# Patient Record
Sex: FEMALE | HISPANIC OR LATINO | URBAN - METROPOLITAN AREA
[De-identification: names, ages, dates, MRNs, and addresses within clinical notes are randomized per-mention and may not be internally consistent; named-entity substitution may affect disease eponyms.]

---

## 2017-05-21 ENCOUNTER — INPATIENT (INPATIENT)
Facility: HOSPITAL | Age: 82
LOS: 10 days | End: 2017-06-01
Attending: FAMILY MEDICINE | Admitting: FAMILY MEDICINE
Payer: MEDICARE

## 2017-05-21 VITALS — HEIGHT: 60 IN | WEIGHT: 179.9 LBS

## 2017-05-21 PROCEDURE — 99285 EMERGENCY DEPT VISIT HI MDM: CPT | Mod: 25

## 2017-05-21 PROCEDURE — 93010 ELECTROCARDIOGRAM REPORT: CPT

## 2017-05-21 RX ORDER — SODIUM CHLORIDE 9 MG/ML
1000 INJECTION INTRAMUSCULAR; INTRAVENOUS; SUBCUTANEOUS ONCE
Qty: 0 | Refills: 0 | Status: COMPLETED | OUTPATIENT
Start: 2017-05-21 | End: 2017-05-21

## 2017-05-21 RX ORDER — MORPHINE SULFATE 50 MG/1
2 CAPSULE, EXTENDED RELEASE ORAL ONCE
Qty: 0 | Refills: 0 | Status: DISCONTINUED | OUTPATIENT
Start: 2017-05-21 | End: 2017-05-21

## 2017-05-21 RX ORDER — ONDANSETRON 8 MG/1
4 TABLET, FILM COATED ORAL ONCE
Qty: 0 | Refills: 0 | Status: COMPLETED | OUTPATIENT
Start: 2017-05-21 | End: 2017-05-21

## 2017-05-21 NOTE — ED ADULT NURSE NOTE - OBJECTIVE STATEMENT
pt presents to ED c/o lower abd pain. pt is a&ox3, breathing even with unlabored respirations. pt denies any n/v/d, fevers chills. EKG performed. pt has no s/s of acute distress at this time. will continue to monitor.

## 2017-05-22 LAB
ALBUMIN SERPL ELPH-MCNC: 3.5 G/DL — SIGNIFICANT CHANGE UP (ref 3.3–5)
ALP SERPL-CCNC: 117 U/L — SIGNIFICANT CHANGE UP (ref 40–120)
ALT FLD-CCNC: 26 U/L — SIGNIFICANT CHANGE UP (ref 12–78)
ANION GAP SERPL CALC-SCNC: 6 MMOL/L — SIGNIFICANT CHANGE UP (ref 5–17)
APPEARANCE UR: CLEAR — SIGNIFICANT CHANGE UP
APTT BLD: 31 SEC — SIGNIFICANT CHANGE UP (ref 27.5–37.4)
AST SERPL-CCNC: 26 U/L — SIGNIFICANT CHANGE UP (ref 15–37)
BACTERIA # UR AUTO: (no result)
BASOPHILS # BLD AUTO: 0.1 K/UL — SIGNIFICANT CHANGE UP (ref 0–0.2)
BASOPHILS NFR BLD AUTO: 0.9 % — SIGNIFICANT CHANGE UP (ref 0–2)
BILIRUB SERPL-MCNC: 0.6 MG/DL — SIGNIFICANT CHANGE UP (ref 0.2–1.2)
BILIRUB UR-MCNC: NEGATIVE — SIGNIFICANT CHANGE UP
BUN SERPL-MCNC: 20 MG/DL — SIGNIFICANT CHANGE UP (ref 7–23)
CALCIUM SERPL-MCNC: 8.3 MG/DL — LOW (ref 8.5–10.1)
CHLORIDE SERPL-SCNC: 108 MMOL/L — SIGNIFICANT CHANGE UP (ref 96–108)
CK SERPL-CCNC: 38 U/L — SIGNIFICANT CHANGE UP (ref 26–192)
CK SERPL-CCNC: 42 U/L — SIGNIFICANT CHANGE UP (ref 26–192)
CK SERPL-CCNC: 65 U/L — SIGNIFICANT CHANGE UP (ref 26–192)
CO2 SERPL-SCNC: 24 MMOL/L — SIGNIFICANT CHANGE UP (ref 22–31)
COLOR SPEC: YELLOW — SIGNIFICANT CHANGE UP
CREAT SERPL-MCNC: 1.34 MG/DL — HIGH (ref 0.5–1.3)
DIFF PNL FLD: (no result)
EOSINOPHIL # BLD AUTO: 0.3 K/UL — SIGNIFICANT CHANGE UP (ref 0–0.5)
EOSINOPHIL NFR BLD AUTO: 3.6 % — SIGNIFICANT CHANGE UP (ref 0–6)
EPI CELLS # UR: NEGATIVE — SIGNIFICANT CHANGE UP
GLUCOSE SERPL-MCNC: 143 MG/DL — HIGH (ref 70–99)
GLUCOSE UR QL: NEGATIVE MG/DL — SIGNIFICANT CHANGE UP
HCT VFR BLD CALC: 38 % — SIGNIFICANT CHANGE UP (ref 34.5–45)
HGB BLD-MCNC: 12.3 G/DL — SIGNIFICANT CHANGE UP (ref 11.5–15.5)
INR BLD: 0.95 RATIO — SIGNIFICANT CHANGE UP (ref 0.88–1.16)
KETONES UR-MCNC: NEGATIVE — SIGNIFICANT CHANGE UP
LACTATE SERPL-SCNC: 2.6 MMOL/L — HIGH (ref 0.7–2)
LEUKOCYTE ESTERASE UR-ACNC: NEGATIVE — SIGNIFICANT CHANGE UP
LIDOCAIN IGE QN: 358 U/L — SIGNIFICANT CHANGE UP (ref 73–393)
LIDOCAIN IGE QN: 498 U/L — HIGH (ref 73–393)
LYMPHOCYTES # BLD AUTO: 1.2 K/UL — SIGNIFICANT CHANGE UP (ref 1–3.3)
LYMPHOCYTES # BLD AUTO: 15.4 % — SIGNIFICANT CHANGE UP (ref 13–44)
MAGNESIUM SERPL-MCNC: 1.7 MG/DL — SIGNIFICANT CHANGE UP (ref 1.6–2.6)
MCHC RBC-ENTMCNC: 32.3 GM/DL — SIGNIFICANT CHANGE UP (ref 32–36)
MCHC RBC-ENTMCNC: 33.4 PG — SIGNIFICANT CHANGE UP (ref 27–34)
MCV RBC AUTO: 103.2 FL — HIGH (ref 80–100)
MONOCYTES # BLD AUTO: 0.5 K/UL — SIGNIFICANT CHANGE UP (ref 0–0.9)
MONOCYTES NFR BLD AUTO: 6.2 % — SIGNIFICANT CHANGE UP (ref 2–14)
NEUTROPHILS # BLD AUTO: 5.7 K/UL — SIGNIFICANT CHANGE UP (ref 1.8–7.4)
NEUTROPHILS NFR BLD AUTO: 73.9 % — SIGNIFICANT CHANGE UP (ref 43–77)
NITRITE UR-MCNC: NEGATIVE — SIGNIFICANT CHANGE UP
PH UR: 6 — SIGNIFICANT CHANGE UP (ref 5–8)
PLATELET # BLD AUTO: 272 K/UL — SIGNIFICANT CHANGE UP (ref 150–400)
POTASSIUM SERPL-MCNC: 5.2 MMOL/L — SIGNIFICANT CHANGE UP (ref 3.5–5.3)
POTASSIUM SERPL-SCNC: 5.2 MMOL/L — SIGNIFICANT CHANGE UP (ref 3.5–5.3)
PROT SERPL-MCNC: 8.2 GM/DL — SIGNIFICANT CHANGE UP (ref 6–8.3)
PROT UR-MCNC: NEGATIVE MG/DL — SIGNIFICANT CHANGE UP
PROTHROM AB SERPL-ACNC: 10.2 SEC — SIGNIFICANT CHANGE UP (ref 9.8–12.7)
RBC # BLD: 3.68 M/UL — LOW (ref 3.8–5.2)
RBC # FLD: 12.4 % — SIGNIFICANT CHANGE UP (ref 10.3–14.5)
RBC CASTS # UR COMP ASSIST: (no result) /HPF (ref 0–4)
SODIUM SERPL-SCNC: 138 MMOL/L — SIGNIFICANT CHANGE UP (ref 135–145)
SP GR SPEC: 1.01 — SIGNIFICANT CHANGE UP (ref 1.01–1.02)
TROPONIN I SERPL-MCNC: 0.03 NG/ML — SIGNIFICANT CHANGE UP (ref 0.01–0.04)
TROPONIN I SERPL-MCNC: <0.015 NG/ML — SIGNIFICANT CHANGE UP (ref 0.01–0.04)
TROPONIN I SERPL-MCNC: <0.015 NG/ML — SIGNIFICANT CHANGE UP (ref 0.01–0.04)
UROBILINOGEN FLD QL: NEGATIVE MG/DL — SIGNIFICANT CHANGE UP
WBC # BLD: 7.7 K/UL — SIGNIFICANT CHANGE UP (ref 3.8–10.5)
WBC # FLD AUTO: 7.7 K/UL — SIGNIFICANT CHANGE UP (ref 3.8–10.5)
WBC UR QL: SIGNIFICANT CHANGE UP

## 2017-05-22 PROCEDURE — 78226 HEPATOBILIARY SYSTEM IMAGING: CPT | Mod: 26

## 2017-05-22 PROCEDURE — 76705 ECHO EXAM OF ABDOMEN: CPT | Mod: 26

## 2017-05-22 PROCEDURE — 74176 CT ABD & PELVIS W/O CONTRAST: CPT | Mod: 26

## 2017-05-22 RX ORDER — METRONIDAZOLE 500 MG
500 TABLET ORAL ONCE
Qty: 0 | Refills: 0 | Status: COMPLETED | OUTPATIENT
Start: 2017-05-22 | End: 2017-05-22

## 2017-05-22 RX ORDER — OMEPRAZOLE 10 MG/1
1 CAPSULE, DELAYED RELEASE ORAL
Qty: 0 | Refills: 0 | COMMUNITY

## 2017-05-22 RX ORDER — METOPROLOL TARTRATE 50 MG
50 TABLET ORAL DAILY
Qty: 0 | Refills: 0 | Status: DISCONTINUED | OUTPATIENT
Start: 2017-05-22 | End: 2017-05-28

## 2017-05-22 RX ORDER — GLUCAGON INJECTION, SOLUTION 0.5 MG/.1ML
1 INJECTION, SOLUTION SUBCUTANEOUS ONCE
Qty: 0 | Refills: 0 | Status: DISCONTINUED | OUTPATIENT
Start: 2017-05-22 | End: 2017-05-29

## 2017-05-22 RX ORDER — METOPROLOL TARTRATE 50 MG
1 TABLET ORAL
Qty: 0 | Refills: 0 | COMMUNITY

## 2017-05-22 RX ORDER — METRONIDAZOLE 500 MG
500 TABLET ORAL EVERY 8 HOURS
Qty: 0 | Refills: 0 | Status: DISCONTINUED | OUTPATIENT
Start: 2017-05-22 | End: 2017-05-25

## 2017-05-22 RX ORDER — MORPHINE SULFATE 50 MG/1
3.3 CAPSULE, EXTENDED RELEASE ORAL ONCE
Qty: 0 | Refills: 0 | Status: DISCONTINUED | OUTPATIENT
Start: 2017-05-22 | End: 2017-05-22

## 2017-05-22 RX ORDER — CIPROFLOXACIN LACTATE 400MG/40ML
400 VIAL (ML) INTRAVENOUS EVERY 12 HOURS
Qty: 0 | Refills: 0 | Status: DISCONTINUED | OUTPATIENT
Start: 2017-05-22 | End: 2017-05-22

## 2017-05-22 RX ORDER — DEXTROSE 50 % IN WATER 50 %
25 SYRINGE (ML) INTRAVENOUS ONCE
Qty: 0 | Refills: 0 | Status: DISCONTINUED | OUTPATIENT
Start: 2017-05-22 | End: 2017-05-29

## 2017-05-22 RX ORDER — CIPROFLOXACIN LACTATE 400MG/40ML
400 VIAL (ML) INTRAVENOUS ONCE
Qty: 0 | Refills: 0 | Status: COMPLETED | OUTPATIENT
Start: 2017-05-22 | End: 2017-05-22

## 2017-05-22 RX ORDER — ACETAMINOPHEN 500 MG
650 TABLET ORAL EVERY 6 HOURS
Qty: 0 | Refills: 0 | Status: DISCONTINUED | OUTPATIENT
Start: 2017-05-22 | End: 2017-06-01

## 2017-05-22 RX ORDER — LOSARTAN POTASSIUM 100 MG/1
25 TABLET, FILM COATED ORAL DAILY
Qty: 0 | Refills: 0 | Status: DISCONTINUED | OUTPATIENT
Start: 2017-05-22 | End: 2017-05-28

## 2017-05-22 RX ORDER — SODIUM CHLORIDE 9 MG/ML
1000 INJECTION, SOLUTION INTRAVENOUS
Qty: 0 | Refills: 0 | Status: DISCONTINUED | OUTPATIENT
Start: 2017-05-22 | End: 2017-05-29

## 2017-05-22 RX ORDER — ASPIRIN/CALCIUM CARB/MAGNESIUM 324 MG
325 TABLET ORAL DAILY
Qty: 0 | Refills: 0 | Status: DISCONTINUED | OUTPATIENT
Start: 2017-05-22 | End: 2017-05-25

## 2017-05-22 RX ORDER — SODIUM CHLORIDE 9 MG/ML
1000 INJECTION INTRAMUSCULAR; INTRAVENOUS; SUBCUTANEOUS
Qty: 0 | Refills: 0 | Status: DISCONTINUED | OUTPATIENT
Start: 2017-05-22 | End: 2017-05-25

## 2017-05-22 RX ORDER — LOSARTAN POTASSIUM 100 MG/1
25 TABLET, FILM COATED ORAL
Qty: 0 | Refills: 0 | COMMUNITY

## 2017-05-22 RX ORDER — HEPARIN SODIUM 5000 [USP'U]/ML
5000 INJECTION INTRAVENOUS; SUBCUTANEOUS EVERY 12 HOURS
Qty: 0 | Refills: 0 | Status: DISCONTINUED | OUTPATIENT
Start: 2017-05-22 | End: 2017-05-25

## 2017-05-22 RX ORDER — INSULIN LISPRO 100/ML
VIAL (ML) SUBCUTANEOUS AT BEDTIME
Qty: 0 | Refills: 0 | Status: DISCONTINUED | OUTPATIENT
Start: 2017-05-22 | End: 2017-05-28

## 2017-05-22 RX ORDER — DEXTROSE 50 % IN WATER 50 %
1 SYRINGE (ML) INTRAVENOUS ONCE
Qty: 0 | Refills: 0 | Status: DISCONTINUED | OUTPATIENT
Start: 2017-05-22 | End: 2017-05-29

## 2017-05-22 RX ORDER — POLYETHYLENE GLYCOL 3350 17 G/17G
17 POWDER, FOR SOLUTION ORAL DAILY
Qty: 0 | Refills: 0 | Status: DISCONTINUED | OUTPATIENT
Start: 2017-05-22 | End: 2017-06-01

## 2017-05-22 RX ORDER — CIPROFLOXACIN LACTATE 400MG/40ML
400 VIAL (ML) INTRAVENOUS ONCE
Qty: 0 | Refills: 0 | Status: COMPLETED | OUTPATIENT
Start: 2017-05-22 | End: 2017-05-23

## 2017-05-22 RX ORDER — ATORVASTATIN CALCIUM 80 MG/1
10 TABLET, FILM COATED ORAL AT BEDTIME
Qty: 0 | Refills: 0 | Status: DISCONTINUED | OUTPATIENT
Start: 2017-05-22 | End: 2017-06-01

## 2017-05-22 RX ORDER — DOCUSATE SODIUM 100 MG
100 CAPSULE ORAL THREE TIMES A DAY
Qty: 0 | Refills: 0 | Status: DISCONTINUED | OUTPATIENT
Start: 2017-05-22 | End: 2017-05-29

## 2017-05-22 RX ORDER — INSULIN LISPRO 100/ML
VIAL (ML) SUBCUTANEOUS
Qty: 0 | Refills: 0 | Status: DISCONTINUED | OUTPATIENT
Start: 2017-05-22 | End: 2017-05-28

## 2017-05-22 RX ORDER — SODIUM CHLORIDE 9 MG/ML
1000 INJECTION INTRAMUSCULAR; INTRAVENOUS; SUBCUTANEOUS ONCE
Qty: 0 | Refills: 0 | Status: COMPLETED | OUTPATIENT
Start: 2017-05-22 | End: 2017-05-22

## 2017-05-22 RX ORDER — PANTOPRAZOLE SODIUM 20 MG/1
40 TABLET, DELAYED RELEASE ORAL
Qty: 0 | Refills: 0 | Status: DISCONTINUED | OUTPATIENT
Start: 2017-05-22 | End: 2017-05-28

## 2017-05-22 RX ORDER — DEXTROSE 50 % IN WATER 50 %
12.5 SYRINGE (ML) INTRAVENOUS ONCE
Qty: 0 | Refills: 0 | Status: DISCONTINUED | OUTPATIENT
Start: 2017-05-22 | End: 2017-05-29

## 2017-05-22 RX ADMIN — SODIUM CHLORIDE 1000 MILLILITER(S): 9 INJECTION INTRAMUSCULAR; INTRAVENOUS; SUBCUTANEOUS at 04:33

## 2017-05-22 RX ADMIN — SODIUM CHLORIDE 100 MILLILITER(S): 9 INJECTION INTRAMUSCULAR; INTRAVENOUS; SUBCUTANEOUS at 19:08

## 2017-05-22 RX ADMIN — MORPHINE SULFATE 2 MILLIGRAM(S): 50 CAPSULE, EXTENDED RELEASE ORAL at 03:23

## 2017-05-22 RX ADMIN — Medication 50 MILLIGRAM(S): at 19:08

## 2017-05-22 RX ADMIN — SODIUM CHLORIDE 1000 MILLILITER(S): 9 INJECTION INTRAMUSCULAR; INTRAVENOUS; SUBCUTANEOUS at 00:00

## 2017-05-22 RX ADMIN — ONDANSETRON 4 MILLIGRAM(S): 8 TABLET, FILM COATED ORAL at 00:00

## 2017-05-22 RX ADMIN — Medication 100 MILLIGRAM(S): at 04:33

## 2017-05-22 RX ADMIN — ATORVASTATIN CALCIUM 10 MILLIGRAM(S): 80 TABLET, FILM COATED ORAL at 21:39

## 2017-05-22 RX ADMIN — Medication 100 MILLIGRAM(S): at 19:08

## 2017-05-22 RX ADMIN — Medication 200 MILLIGRAM(S): at 06:15

## 2017-05-22 RX ADMIN — MORPHINE SULFATE 3.3 MILLIGRAM(S): 50 CAPSULE, EXTENDED RELEASE ORAL at 12:05

## 2017-05-22 RX ADMIN — MORPHINE SULFATE 2 MILLIGRAM(S): 50 CAPSULE, EXTENDED RELEASE ORAL at 00:00

## 2017-05-22 NOTE — ED PROVIDER NOTE - CHPI ED SYMPTOMS NEG
no abdominal distension/no fever/no hematuria/no dysuria/no vomiting/no chills/no burning urination/no nausea/no diarrhea

## 2017-05-22 NOTE — ED PROVIDER NOTE - PROGRESS NOTE DETAILS
Discussed labs, CT and US results with patient and her son.  Son left to go home as he has work tomorrow.  Left home phone number on paper chart with Nurse.  Pt. has seen Dr. Pettit in the past and requests to be seen by him during her hospital stay.  Spoke to Dr. Augustin about her admission who is requesting Cipro, Flagyl to be given even though no fever or elevation of WBC.  Also requesting lactate be added.

## 2017-05-22 NOTE — H&P ADULT - HISTORY OF PRESENT ILLNESS
87F.  c/o abdominal pain.  left flank which wraps arround to right side beneath diaphragm.   onset 1 day ago.  no provocative or palliative factors.  no a/w diet.    ED given MS + Zosyn + NS.  US (+) gallstone and sludge.  CT AB/pelvis (+) gallstone and sludge in gallbladder neck.  (+) inflammation.  lactate 2.6.      PMHx:  type 2DM;  HTN;  hyperlipidemia.    PSHx:  hip sx.    ALL:  PCN-swelling of the lips.    Rx:  reviewed.    SocHx:  lives w/ son in the community.  able to ambulate w/ cane.  no tobacco.  no EtOH.    FHx:  NC.

## 2017-05-22 NOTE — ED PROVIDER NOTE - GASTROINTESTINAL NEGATIVE STATEMENT, MLM
+left flank pain and LUQ pain that radiates to RUQ, no bloating, no constipation, no diarrhea, no nausea and no vomiting.

## 2017-05-22 NOTE — ED PROVIDER NOTE - OBJECTIVE STATEMENT
86 y/o F with PMH of DM, HTN, HLD presents to ED for x1 day of left flank and LUQ pain that radiates across upper abd to RUQ. Pt denies similar symptoms in the past. No associated fever or N/V/D. Denies change in urine or any dysuria. No rash, CP, SOB. PMD

## 2017-05-22 NOTE — ED PROVIDER NOTE - NS ED MD SCRIBE ATTENDING SCRIBE SECTIONS
PHYSICAL EXAM/REVIEW OF SYSTEMS/DISPOSITION/RESULTS/HISTORY OF PRESENT ILLNESS/PAST MEDICAL/SURGICAL/SOCIAL HISTORY

## 2017-05-22 NOTE — ED ADULT NURSE REASSESSMENT NOTE - NS ED NURSE REASSESS COMMENT FT1
[phone]- Radha (son's wife-preferred number to call)  [phone]- [place]  [phone]-  (son)

## 2017-05-22 NOTE — H&P ADULT - ASSESSMENT
gallstone pancreatitis.  r/o acute cholecystitis.  -elevated lactate 2.6, c/w IVFs and f/u serial lactate.  -trend LFTs, lipase, amylase and CBC.   -clear liquid diet.  -HIDA scan.  -c/w ciprofloxacin + metronidazole.  -GI + Sx + ID consults.    nonspecific EKG changes.  -V2-V3 new nonspecific changes as compared to 04/2016.    hx HTN.  -c/w losartan.    hx hyperlipidemia.  -c/w statin.    hx type 2DM.  -A1C.  -ADA.  -correction insulin coverage. gallstone pancreatitis.  r/o acute cholecystitis.  -elevated lactate 2.6, c/w IVFs and f/u serial lactate.  -f/u BCx.  -trend LFTs, lipase, amylase and CBC.   -clear liquid diet.  -HIDA scan.  -c/w ciprofloxacin + metronidazole.  -GI + Sx + ID consults.    nonspecific EKG changes.  -V2-V3 new nonspecific changes as compared to 04/2016.    hx HTN.  -c/w losartan.    hx hyperlipidemia.  -c/w statin.    hx type 2DM.  -A1C.  -ADA.  -correction insulin coverage. gallstone pancreatitis.  r/o acute cholecystitis.  -elevated lactate 2.6, c/w IVFs and f/u serial lactate.  -f/u BCx.  -trend LFTs, lipase, amylase and CBC.   -clear liquid diet.  -HIDA scan.  -c/w ciprofloxacin + metronidazole.  -GI + Sx + ID consults.    nonspecific EKG changes.  -at current time, asymptomatic for s/sx of ischemic heart disease.  -V2-V3 new nonspecific changes as compared to 04/2016.    hx HTN.  -c/w losartan.    hx hyperlipidemia.  -c/w statin.    hx type 2DM.  -A1C.  -ADA.  -correction insulin coverage.

## 2017-05-22 NOTE — ED PROVIDER NOTE - DETAILS:
I, Tatum Golden, performed the initial face to face bedside interview with this patient regarding history of present illness, review of symptoms and relevant past medical, social and family history.  I completed an independent physical examination.  I was the initial provider who evaluated this patient.   The history, relevant review of systems, past medical and surgical history, medical decision making, and physical examination was documented by the scribe in my presence and I attest to the accuracy of the documentation.

## 2017-05-22 NOTE — ED ADULT NURSE REASSESSMENT NOTE - NS ED NURSE REASSESS COMMENT FT1
Assumed care of pt. Pt A&Ox3 and calm. Pt denies any abd pain right now. Abd soft, no distention. Abx infusing per orders, will monitor.

## 2017-05-23 LAB
ALBUMIN SERPL ELPH-MCNC: 2.8 G/DL — LOW (ref 3.3–5)
ALP SERPL-CCNC: 83 U/L — SIGNIFICANT CHANGE UP (ref 40–120)
ALT FLD-CCNC: 22 U/L — SIGNIFICANT CHANGE UP (ref 12–78)
AMYLASE P1 CFR SERPL: 81 U/L — SIGNIFICANT CHANGE UP (ref 25–115)
ANION GAP SERPL CALC-SCNC: 9 MMOL/L — SIGNIFICANT CHANGE UP (ref 5–17)
AST SERPL-CCNC: 18 U/L — SIGNIFICANT CHANGE UP (ref 15–37)
BASOPHILS # BLD AUTO: 0.1 K/UL — SIGNIFICANT CHANGE UP (ref 0–0.2)
BASOPHILS NFR BLD AUTO: 1.3 % — SIGNIFICANT CHANGE UP (ref 0–2)
BILIRUB DIRECT SERPL-MCNC: 0.2 MG/DL — SIGNIFICANT CHANGE UP (ref 0–0.2)
BILIRUB INDIRECT FLD-MCNC: 0.9 MG/DL — SIGNIFICANT CHANGE UP (ref 0.2–1)
BILIRUB SERPL-MCNC: 1.1 MG/DL — SIGNIFICANT CHANGE UP (ref 0.2–1.2)
BUN SERPL-MCNC: 11 MG/DL — SIGNIFICANT CHANGE UP (ref 7–23)
CALCIUM SERPL-MCNC: 8.1 MG/DL — LOW (ref 8.5–10.1)
CHLORIDE SERPL-SCNC: 111 MMOL/L — HIGH (ref 96–108)
CO2 SERPL-SCNC: 23 MMOL/L — SIGNIFICANT CHANGE UP (ref 22–31)
CREAT SERPL-MCNC: 0.99 MG/DL — SIGNIFICANT CHANGE UP (ref 0.5–1.3)
CULTURE RESULTS: NO GROWTH — SIGNIFICANT CHANGE UP
EOSINOPHIL # BLD AUTO: 0.3 K/UL — SIGNIFICANT CHANGE UP (ref 0–0.5)
EOSINOPHIL NFR BLD AUTO: 4.8 % — SIGNIFICANT CHANGE UP (ref 0–6)
GLUCOSE SERPL-MCNC: 129 MG/DL — HIGH (ref 70–99)
HBA1C BLD-MCNC: 6.1 % — HIGH (ref 4–5.6)
HCT VFR BLD CALC: 33.4 % — LOW (ref 34.5–45)
HGB BLD-MCNC: 10.6 G/DL — LOW (ref 11.5–15.5)
LACTATE SERPL-SCNC: 0.9 MMOL/L — SIGNIFICANT CHANGE UP (ref 0.7–2)
LIDOCAIN IGE QN: 209 U/L — SIGNIFICANT CHANGE UP (ref 73–393)
LYMPHOCYTES # BLD AUTO: 0.9 K/UL — LOW (ref 1–3.3)
LYMPHOCYTES # BLD AUTO: 16.7 % — SIGNIFICANT CHANGE UP (ref 13–44)
MCHC RBC-ENTMCNC: 31.8 GM/DL — LOW (ref 32–36)
MCHC RBC-ENTMCNC: 32.8 PG — SIGNIFICANT CHANGE UP (ref 27–34)
MCV RBC AUTO: 103.2 FL — HIGH (ref 80–100)
MONOCYTES # BLD AUTO: 0.5 K/UL — SIGNIFICANT CHANGE UP (ref 0–0.9)
MONOCYTES NFR BLD AUTO: 9.5 % — SIGNIFICANT CHANGE UP (ref 2–14)
NEUTROPHILS # BLD AUTO: 3.8 K/UL — SIGNIFICANT CHANGE UP (ref 1.8–7.4)
NEUTROPHILS NFR BLD AUTO: 67.7 % — SIGNIFICANT CHANGE UP (ref 43–77)
PLATELET # BLD AUTO: 209 K/UL — SIGNIFICANT CHANGE UP (ref 150–400)
POTASSIUM SERPL-MCNC: 5 MMOL/L — SIGNIFICANT CHANGE UP (ref 3.5–5.3)
POTASSIUM SERPL-SCNC: 5 MMOL/L — SIGNIFICANT CHANGE UP (ref 3.5–5.3)
PROT SERPL-MCNC: 6.8 GM/DL — SIGNIFICANT CHANGE UP (ref 6–8.3)
RBC # BLD: 3.24 M/UL — LOW (ref 3.8–5.2)
RBC # FLD: 12.4 % — SIGNIFICANT CHANGE UP (ref 10.3–14.5)
SODIUM SERPL-SCNC: 143 MMOL/L — SIGNIFICANT CHANGE UP (ref 135–145)
SPECIMEN SOURCE: SIGNIFICANT CHANGE UP
WBC # BLD: 5.6 K/UL — SIGNIFICANT CHANGE UP (ref 3.8–10.5)
WBC # FLD AUTO: 5.6 K/UL — SIGNIFICANT CHANGE UP (ref 3.8–10.5)

## 2017-05-23 RX ORDER — CIPROFLOXACIN LACTATE 400MG/40ML
400 VIAL (ML) INTRAVENOUS EVERY 12 HOURS
Qty: 0 | Refills: 0 | Status: DISCONTINUED | OUTPATIENT
Start: 2017-05-23 | End: 2017-05-25

## 2017-05-23 RX ADMIN — Medication 2: at 13:40

## 2017-05-23 RX ADMIN — Medication 200 MILLIGRAM(S): at 00:13

## 2017-05-23 RX ADMIN — HEPARIN SODIUM 5000 UNIT(S): 5000 INJECTION INTRAVENOUS; SUBCUTANEOUS at 05:11

## 2017-05-23 RX ADMIN — POLYETHYLENE GLYCOL 3350 17 GRAM(S): 17 POWDER, FOR SOLUTION ORAL at 11:17

## 2017-05-23 RX ADMIN — Medication 100 MILLIGRAM(S): at 05:10

## 2017-05-23 RX ADMIN — Medication 1 TABLET(S): at 11:09

## 2017-05-23 RX ADMIN — Medication 325 MILLIGRAM(S): at 11:09

## 2017-05-23 RX ADMIN — SODIUM CHLORIDE 100 MILLILITER(S): 9 INJECTION INTRAMUSCULAR; INTRAVENOUS; SUBCUTANEOUS at 11:07

## 2017-05-23 RX ADMIN — PANTOPRAZOLE SODIUM 40 MILLIGRAM(S): 20 TABLET, DELAYED RELEASE ORAL at 06:56

## 2017-05-23 RX ADMIN — Medication 650 MILLIGRAM(S): at 11:10

## 2017-05-23 RX ADMIN — ATORVASTATIN CALCIUM 10 MILLIGRAM(S): 80 TABLET, FILM COATED ORAL at 22:38

## 2017-05-23 RX ADMIN — Medication 100 MILLIGRAM(S): at 22:12

## 2017-05-23 RX ADMIN — Medication 50 MILLIGRAM(S): at 05:14

## 2017-05-23 RX ADMIN — HEPARIN SODIUM 5000 UNIT(S): 5000 INJECTION INTRAVENOUS; SUBCUTANEOUS at 17:49

## 2017-05-23 RX ADMIN — LOSARTAN POTASSIUM 25 MILLIGRAM(S): 100 TABLET, FILM COATED ORAL at 05:11

## 2017-05-23 RX ADMIN — Medication 200 MILLIGRAM(S): at 23:43

## 2017-05-23 RX ADMIN — Medication 200 MILLIGRAM(S): at 11:09

## 2017-05-23 RX ADMIN — SODIUM CHLORIDE 100 MILLILITER(S): 9 INJECTION INTRAMUSCULAR; INTRAVENOUS; SUBCUTANEOUS at 22:33

## 2017-05-23 RX ADMIN — Medication 100 MILLIGRAM(S): at 13:47

## 2017-05-23 RX ADMIN — Medication 650 MILLIGRAM(S): at 22:12

## 2017-05-23 NOTE — CDI QUERY NOTE - NSCDIOTHERTXTBX_GEN_ALL_CORE_HH
On admission lactate level = 2.6 now lactate= 0.9    Please clarify if the lactate level of 2.6 was indicative of a diagnosis ?  ie..  lactic acidosis now resolved ?  .....  Lacticemia, now resolved ?  ....  Other ? Please clarify

## 2017-05-23 NOTE — PROGRESS NOTE ADULT - SUBJECTIVE AND OBJECTIVE BOX
5/23- seen and examined,  phone used #[phone]- complaining of left sided abdominal pain -radiating to the right side. Stated that it is worse than last night.    Vital Signs Last 24 Hrs  T(C): 36.6, Max: 36.9 (05-22 @ 21:30)  T(F): 97.9, Max: 98.5 (05-22 @ 21:30)  HR: 68 (68 - 78)  BP: 156/85 (120/76 - 156/85)  BP(mean): --  RR: 18 (17 - 18)  SpO2: 97% (93% - 97%)    ROS:   All 10 systems reviewed and found to be negative with the exception of what has been described above.    PE:  Constitutional: NAD, OOB to chair  HEENT: NC/AT, EOMI, PERRLA  Neck: supple  Back: no tenderness  Respiratory: LCTA  Cardiovascular: S1S2 regular, no murmurs  Abdomen: obese- Left upper quadrant tenderness +BS soft  Genitourinary: voiding  Rectal: deferred  Musculoskeletal: no muscle tenderness, no joint swelling or tenderness  Extremities: no pedal edema   Neurological: no focal deficits                           10.6   5.6   )-----------( 209      ( 23 May 2017 06:23 )             33.4   05-23    143  |  111<H>  |  11  ----------------------------<  129<H>  5.0   |  23  |  0.99    Ca    8.1<L>      23 May 2017 06:23  Mg     1.7     05-21    TPro  6.8  /  Alb  2.8<L>  /  TBili  1.1  /  DBili  0.2  /  AST  18  /  ALT  22  /  AlkPhos  83  05-23

## 2017-05-23 NOTE — PROGRESS NOTE ADULT - ASSESSMENT
86 y/o Female with h/o DM type 2, HL, HTN was admitted on 5/22 for worsening abdominal pain. She has left flank pain which wraps around to right side beneath diaphragm. In ED she received Zosyn.     1. Acute gallstone cholecystitis. Allergy to PCN.  -abdominal pain is improving  -f/u BC x 2  -on cipro 400 mg IV q12h and metronidazole 500 mg IV q8h # 2  -tolerating abx well so far; no side effects noted   -continue abx coverage  -monitor temps  -f/u CBC  -supportive care  2. Other issues: DM type 2, HL, HTN  -care per medicine

## 2017-05-23 NOTE — PROGRESS NOTE ADULT - SUBJECTIVE AND OBJECTIVE BOX
Patient is a 87y old  Female who presents with a chief complaint of abdominal pain. (22 May 2017 10:30)    HPI:  88 y/o Female with h/o DM type 2, HL, HTN was admitted on  for worsening abdominal pain. She has left flank pain which wraps around to right side beneath diaphragm. In ED she received Zosyn.  US (+) gallstone and sludge.  CT AB/pelvis (+) gallstone and sludge in gallbladder neck.  (+) inflammation.  lactate 2.6.      Abdominal pain is improving  No fever or chills  Poor appetite    MEDICATIONS  (STANDING):  heparin  Injectable 5000Unit(s) SubCutaneous every 12 hours  sodium chloride 0.9%. 1000milliLiter(s) IV Continuous <Continuous>  docusate sodium 100milliGRAM(s) Oral three times a day  insulin lispro (HumaLOG) corrective regimen sliding scale  SubCutaneous three times a day before meals  insulin lispro (HumaLOG) corrective regimen sliding scale  SubCutaneous at bedtime  dextrose 5%. 1000milliLiter(s) IV Continuous <Continuous>  dextrose 50% Injectable 12.5Gram(s) IV Push once  dextrose 50% Injectable 25Gram(s) IV Push once  dextrose 50% Injectable 25Gram(s) IV Push once  metroNIDAZOLE  IVPB 500milliGRAM(s) IV Intermittent every 8 hours  aspirin 325milliGRAM(s) Oral daily  losartan 25milliGRAM(s) Oral daily  atorvastatin 10milliGRAM(s) Oral at bedtime  metoprolol succinate ER 50milliGRAM(s) Oral daily  polyethylene glycol 3350 17Gram(s) Oral daily  pantoprazole    Tablet 40milliGRAM(s) Oral before breakfast  multivitamin 1Tablet(s) Oral daily  ciprofloxacin   IVPB 400milliGRAM(s) IV Intermittent every 12 hours    MEDICATIONS  (PRN):  acetaminophen   Tablet 650milliGRAM(s) Oral every 6 hours PRN For Temp greater than 38.5 C (101.3 F)  acetaminophen   Tablet. 650milliGRAM(s) Oral every 6 hours PRN Mild Pain (1 - 3)  dextrose Gel 1Dose(s) Oral once PRN Blood Glucose LESS THAN 70 milliGRAM(s)/deciliter  glucagon  Injectable 1milliGRAM(s) IntraMuscular once PRN Glucose LESS THAN 70 milligrams/deciliter      Vital Signs Last 24 Hrs  T(C): 36.7, Max: 36.9 ( @ 21:30)  T(F): 98, Max: 98.5 ( @ 21:30)  HR: 71 (70 - 78)  BP: 123/52 (120/76 - 135/59)  BP(mean): --  RR: 17 (17 - 18)  SpO2: 97% (93% - 97%)    Physical Exam:        Constitutional: frail looking  HEENT: NC/AT, EOMI, PERRLA  Neck: supple  Back: no tenderness  Respiratory: clear  Cardiovascular: S1S2 regular, no murmurs  Abdomen: soft, RUQ abdomen tender, not distended, positive BS  Genitourinary: deferred  Rectal: deferred  Musculoskeletal: no muscle tenderness, no joint swelling or tenderness  Extremities: no pedal edema  Neurological: AxOx3, moving all extremities, no focal deficits  Skin: no rashes    Labs:                        10.6   5.6   )-----------( 209      ( 23 May 2017 06:23 )             33.4         143  |  111<H>  |  11  ----------------------------<  129<H>  5.0   |  23  |  0.99    Ca    8.1<L>      23 May 2017 06:23  Mg     1.7         TPro  6.8  /  Alb  2.8<L>  /  TBili  1.1  /  DBili  0.2  /  AST  18  /  ALT  22  /  AlkPhos  83                 12.3   7.7   )-----------( 272      ( 21 May 2017 22:34 )             38.0         138  |  108  |  20  ----------------------------<  143<H>  5.2   |  24  |  1.34<H>    Ca    8.3<L>      21 May 2017 22:34  Mg     1.7         TPro  8.2  /  Alb  3.5  /  TBili  0.6  /  DBili  x   /  AST  26  /  ALT  26  /  AlkPhos  117       LIVER FUNCTIONS - ( 21 May 2017 22:34 )  Alb: 3.5 g/dL / Pro: 8.2 gm/dL / ALK PHOS: 117 U/L / ALT: 26 U/L / AST: 26 U/L / GGT: x           Urinalysis Basic - ( 22 May 2017 02:04 )    Color: Yellow / Appearance: Clear / S.010 / pH: x  Gluc: x / Ketone: Negative  / Bili: Negative / Urobili: Negative mg/dL   Blood: x / Protein: Negative mg/dL / Nitrite: Negative   Leuk Esterase: Negative / RBC: 3-5 /HPF / WBC 0-2   Sq Epi: x / Non Sq Epi: Negative / Bacteria: Occasional      Radiology:    CT abdomen and pelvis:   Gallstones and/or sludge with gallstones suggested in the gallbladder   neck. Predominantly pericholecystic inflammatory change, increased from   prior study. Findings could represent acute cholecystitis. Consider   further evaluation with nuclear medicine DISIDA scan which is more   sensitive for the detection of such diagnosis.    HIDA:  Abnormal morphine-augmented hepatobiliary scan.  Findings consistent with acute cholecystitis.    Advanced directives addressed: full resuscitation

## 2017-05-23 NOTE — PROVIDER CONTACT NOTE (OTHER) - ACTION/TREATMENT ORDERED:
Received orders to apply 2L nasal canula and to monitor patient according to mews protocol. If pt still has a temperature of 100.0 or greater at 0100 hrs, received orders to administer 650 Tylenol Received orders to apply 2L nasal canula and to monitor patient according to mews protocol. If pt still has a temperature of 101.0 or greater at 0100 hrs, received orders to administer 650 Tylenol

## 2017-05-23 NOTE — PROGRESS NOTE ADULT - ATTENDING COMMENTS
Pt seen and examined. Note edited where appropriate. Pt with acute cholecystitis, awaiting surgical and GI consults. Pt seen and examined. Note edited where appropriate. Pt with acute cholecystitis, awaiting surgical and GI consults, c/w cipro/flagyl.

## 2017-05-23 NOTE — PROGRESS NOTE ADULT - ASSESSMENT
gallstone pancreatitis.  r/o acute cholecystitis.  -Lactatemia- now resolved with IVF hydration  -f/u BCx.  -trend LFTs, lipase, amylase and CBC.   -clear liquid diet.  -HIDA scan.  -c/w ciprofloxacin + metronidazole.  -GI consult with Dr Pettit and Surgery consult    nonspecific EKG changes.  -at current time, asymptomatic for s/sx of ischemic heart disease.  -V2-V3 new nonspecific changes as compared to 04/2016.    hx HTN.  -c/w losartan.    hx hyperlipidemia.  -c/w statin.    hx type 2DM.  - Hemoglobin a1c results noted  - ADA.  - correction insulin coverage. # Acute Cholecystitis.  -No evidence of pancreatitis (mild lipase elevation on admission which has resolved and no CT evidence of pancreatitis.  -Lactatemia- now resolved with IVF hydration  -f/u BCx.  -clear liquid diet.  -HIDA scan c/w cholecystitis  -c/w ciprofloxacin + metronidazole.  -GI consult with Dr Pettit and Surgery consult    # nonspecific EKG changes.  -at current time, asymptomatic for s/sx of ischemic heart disease.  -V2-V3 new nonspecific changes as compared to 04/2016.    # hx HTN.  -c/w losartan.    # hx hyperlipidemia.  -c/w statin.    # Type 2DM.  -a1c 6.1  - ADA.  - correction insulin coverage.

## 2017-05-24 LAB
ALBUMIN SERPL ELPH-MCNC: 2.8 G/DL — LOW (ref 3.3–5)
ALP SERPL-CCNC: 86 U/L — SIGNIFICANT CHANGE UP (ref 40–120)
ALT FLD-CCNC: 22 U/L — SIGNIFICANT CHANGE UP (ref 12–78)
ANION GAP SERPL CALC-SCNC: 9 MMOL/L — SIGNIFICANT CHANGE UP (ref 5–17)
AST SERPL-CCNC: 24 U/L — SIGNIFICANT CHANGE UP (ref 15–37)
BILIRUB SERPL-MCNC: 1.6 MG/DL — HIGH (ref 0.2–1.2)
BUN SERPL-MCNC: 10 MG/DL — SIGNIFICANT CHANGE UP (ref 7–23)
CALCIUM SERPL-MCNC: 7.8 MG/DL — LOW (ref 8.5–10.1)
CHLORIDE SERPL-SCNC: 107 MMOL/L — SIGNIFICANT CHANGE UP (ref 96–108)
CO2 SERPL-SCNC: 22 MMOL/L — SIGNIFICANT CHANGE UP (ref 22–31)
CREAT SERPL-MCNC: 1.06 MG/DL — SIGNIFICANT CHANGE UP (ref 0.5–1.3)
GLUCOSE SERPL-MCNC: 141 MG/DL — HIGH (ref 70–99)
HCT VFR BLD CALC: 33.5 % — LOW (ref 34.5–45)
HGB BLD-MCNC: 11.5 G/DL — SIGNIFICANT CHANGE UP (ref 11.5–15.5)
MCHC RBC-ENTMCNC: 33.4 PG — SIGNIFICANT CHANGE UP (ref 27–34)
MCHC RBC-ENTMCNC: 34.2 GM/DL — SIGNIFICANT CHANGE UP (ref 32–36)
MCV RBC AUTO: 97.7 FL — SIGNIFICANT CHANGE UP (ref 80–100)
PLATELET # BLD AUTO: 204 K/UL — SIGNIFICANT CHANGE UP (ref 150–400)
POTASSIUM SERPL-MCNC: 4.2 MMOL/L — SIGNIFICANT CHANGE UP (ref 3.5–5.3)
POTASSIUM SERPL-SCNC: 4.2 MMOL/L — SIGNIFICANT CHANGE UP (ref 3.5–5.3)
PROT SERPL-MCNC: 6.8 GM/DL — SIGNIFICANT CHANGE UP (ref 6–8.3)
RBC # BLD: 3.43 M/UL — LOW (ref 3.8–5.2)
RBC # FLD: 11.7 % — SIGNIFICANT CHANGE UP (ref 10.3–14.5)
SODIUM SERPL-SCNC: 138 MMOL/L — SIGNIFICANT CHANGE UP (ref 135–145)
WBC # BLD: 15.2 K/UL — HIGH (ref 3.8–10.5)
WBC # FLD AUTO: 15.2 K/UL — HIGH (ref 3.8–10.5)

## 2017-05-24 RX ADMIN — HEPARIN SODIUM 5000 UNIT(S): 5000 INJECTION INTRAVENOUS; SUBCUTANEOUS at 06:53

## 2017-05-24 RX ADMIN — Medication 100 MILLIGRAM(S): at 09:27

## 2017-05-24 RX ADMIN — ATORVASTATIN CALCIUM 10 MILLIGRAM(S): 80 TABLET, FILM COATED ORAL at 21:09

## 2017-05-24 RX ADMIN — Medication 100 MILLIGRAM(S): at 14:02

## 2017-05-24 RX ADMIN — SODIUM CHLORIDE 100 MILLILITER(S): 9 INJECTION INTRAMUSCULAR; INTRAVENOUS; SUBCUTANEOUS at 21:10

## 2017-05-24 RX ADMIN — HEPARIN SODIUM 5000 UNIT(S): 5000 INJECTION INTRAVENOUS; SUBCUTANEOUS at 17:09

## 2017-05-24 RX ADMIN — Medication 650 MILLIGRAM(S): at 17:19

## 2017-05-24 RX ADMIN — Medication 1 TABLET(S): at 11:10

## 2017-05-24 RX ADMIN — Medication 650 MILLIGRAM(S): at 06:23

## 2017-05-24 RX ADMIN — Medication 100 MILLIGRAM(S): at 06:53

## 2017-05-24 RX ADMIN — LOSARTAN POTASSIUM 25 MILLIGRAM(S): 100 TABLET, FILM COATED ORAL at 06:54

## 2017-05-24 RX ADMIN — Medication 200 MILLIGRAM(S): at 22:36

## 2017-05-24 RX ADMIN — Medication 100 MILLIGRAM(S): at 21:09

## 2017-05-24 RX ADMIN — POLYETHYLENE GLYCOL 3350 17 GRAM(S): 17 POWDER, FOR SOLUTION ORAL at 11:10

## 2017-05-24 RX ADMIN — Medication 50 MILLIGRAM(S): at 06:53

## 2017-05-24 RX ADMIN — PANTOPRAZOLE SODIUM 40 MILLIGRAM(S): 20 TABLET, DELAYED RELEASE ORAL at 06:53

## 2017-05-24 RX ADMIN — Medication 200 MILLIGRAM(S): at 11:15

## 2017-05-24 RX ADMIN — Medication 325 MILLIGRAM(S): at 11:10

## 2017-05-24 NOTE — PROGRESS NOTE ADULT - ASSESSMENT
88 y/o Female with h/o DM type 2, HL, HTN was admitted on 5/22 for worsening abdominal pain. She has left flank pain which wraps around to right side beneath diaphragm. In ED she received Zosyn.     1. Acute gallstone cholecystitis. Allergy to PCN.  -febrile syndrome  -f/u BC x 2  -on cipro 400 mg IV q12h and metronidazole 500 mg IV q8h # 3  -tolerating abx well so far; no side effects noted   -repeat BC x 2  -continue abx coverage  -monitor temps  -f/u CBC  -supportive care  2. Other issues: DM type 2, HL, HTN  -care per medicine

## 2017-05-24 NOTE — PROGRESS NOTE ADULT - ASSESSMENT
# Acute Cholecystitis.  -No evidence of pancreatitis (mild lipase elevation on admission which has resolved and no CT evidence of pancreatitis.  -Lactatemia- now resolved with IVF hydration  -f/u BCx- NGTD  -clear liquid diet  -HIDA scan c/w cholecystitis  -c/w ciprofloxacin + metronidazole D#2  -surgery consult appreciated as per note patient would to speak with Dr Pettit first prior to agreeing with surgery  - GI consult with Dr Pettit    # nonspecific EKG changes.  -at current time, asymptomatic for s/sx of ischemic heart disease.  -V2-V3 new nonspecific changes as compared to 04/2016.    # hx HTN.  -c/w losartan.    # hx hyperlipidemia.  -c/w statin.    # Type 2DM.  - a1c 6.1  - ADA.  - correction insulin coverage.      Case discussed with Dr Hinojosa.

## 2017-05-24 NOTE — PROGRESS NOTE ADULT - ATTENDING COMMENTS
pt seen and examined. agree with above. pt awaiting discussion with carmel prior to proceeding with surgery.

## 2017-05-24 NOTE — PROGRESS NOTE ADULT - SUBJECTIVE AND OBJECTIVE BOX
5/24- patient seen and examined. No overnight events- complaining of pain on the left side of abdomen but tolerable.     Vital Signs Last 24 Hrs  T(C): 37.6, Max: 39.3 (05-23 @ 21:26)  T(F): 99.6, Max: 102.8 (05-23 @ 21:26)  HR: 81 (81 - 96)  BP: 104/52 (104/52 - 152/58)  BP(mean): --  RR: 18 (18 - 19)  SpO2: 95% (93% - 96%)    ROS:   All 10 systems reviewed and found to be negative with the exception of what has been described above.    PE:  Constitutional: NAD, OOB to chair  HEENT: NC/AT, EOMI, PERRLA  Neck: supple  Back: no tenderness  Respiratory: LCTA  Cardiovascular: S1S2 regular, no murmurs  Abdomen: obese- Left upper quadrant tenderness +BS soft  Genitourinary: voiding  Rectal: deferred  Musculoskeletal: no muscle tenderness, no joint swelling or tenderness  Extremities: no pedal edema   Neurological: no focal deficits                           11.5   15.2  )-----------( 204      ( 24 May 2017 05:55 )             33.5     05-24    138  |  107  |  10  ----------------------------<  141<H>  4.2   |  22  |  1.06    Ca    7.8<L>      24 May 2017 05:55    TPro  6.8  /  Alb  2.8<L>  /  TBili  1.6<H>  /  DBili  x   /  AST  24  /  ALT  22  /  AlkPhos  86  05-24                          10.6   5.6   )-----------( 209      ( 23 May 2017 06:23 )             33.4   05-23    143  |  111<H>  |  11  ----------------------------<  129<H>  5.0   |  23  |  0.99    Ca    8.1<L>      23 May 2017 06:23  Mg     1.7     05-21    TPro  6.8  /  Alb  2.8<L>  /  TBili  1.1  /  DBili  0.2  /  AST  18  /  ALT  22  /  AlkPhos  83  05-23      EXAM:  NM HEPATOBILIARY IMG                        PROCEDURE DATE:  05/22/2017    INTERPRETATION:  CLINICAL INFORMATION: 87-year-old female with gallstone   pancreatitis, evaluate for acute cholecystitis.  RADIOPHARMACEUTICAL: 3.3 mCi Tc-99m-Mebrofenin, I.V.; 2 doses  TECHNIQUE:  Dynamic imaging of the anterior abdomen was performed for 2   hours after the injection of radiotracer followed by static images of the   abdomen in the anterior, right lateral and right anterior oblique   projections.  Morphine 3.3 mg I.V. and a second dose of radiotracer were   administered at 1 hour.    COMPARISON: No prior hepatobiliary scan available for comparison.  FINDINGS: There is prompt, homogeneous uptake of radiotracer by the   hepatocytes. Activity is first seen in the bowel at 20 minutes. The   gallbladder is not visualized at any time during the study, despite   administration of morphine. There is good clearance of activity from the   liver at the end of the study.  IMPRESSION: Abnormal morphine-augmented hepatobiliary scan.  Findings consistent with acute cholecystitis.      CT ABDOMEN AND PELVIS  IMPRESSION:  Gallstones and/or sludge with gallstones suggested in the gallbladder   neck. Predominantly pericholecystic inflammatory change, increased from   prior study. Findings could represent acute cholecystitis. Consider   further evaluation with nuclear medicine DISIDA scan which is more   sensitive for the detection of such diagnosis.

## 2017-05-24 NOTE — PROGRESS NOTE ADULT - SUBJECTIVE AND OBJECTIVE BOX
Patient is a 87y old  Female who presents with a chief complaint of abdominal pain. (22 May 2017 10:30)    HPI:  86 y/o Female with h/o DM type 2, HL, HTN was admitted on  for worsening abdominal pain. She has left flank pain which wraps around to right side beneath diaphragm. In ED she received Zosyn.  US (+) gallstone and sludge.  CT AB/pelvis (+) gallstone and sludge in gallbladder neck.  (+) inflammation.  lactate 2.6.      Abdominal pain is improving  Febrile to 102F  Poor appetite    MEDICATIONS  (STANDING):  heparin  Injectable 5000Unit(s) SubCutaneous every 12 hours  sodium chloride 0.9%. 1000milliLiter(s) IV Continuous <Continuous>  docusate sodium 100milliGRAM(s) Oral three times a day  insulin lispro (HumaLOG) corrective regimen sliding scale  SubCutaneous three times a day before meals  insulin lispro (HumaLOG) corrective regimen sliding scale  SubCutaneous at bedtime  dextrose 5%. 1000milliLiter(s) IV Continuous <Continuous>  dextrose 50% Injectable 12.5Gram(s) IV Push once  dextrose 50% Injectable 25Gram(s) IV Push once  dextrose 50% Injectable 25Gram(s) IV Push once  metroNIDAZOLE  IVPB 500milliGRAM(s) IV Intermittent every 8 hours  aspirin 325milliGRAM(s) Oral daily  losartan 25milliGRAM(s) Oral daily  atorvastatin 10milliGRAM(s) Oral at bedtime  metoprolol succinate ER 50milliGRAM(s) Oral daily  polyethylene glycol 3350 17Gram(s) Oral daily  pantoprazole    Tablet 40milliGRAM(s) Oral before breakfast  multivitamin 1Tablet(s) Oral daily  ciprofloxacin   IVPB 400milliGRAM(s) IV Intermittent every 12 hours    MEDICATIONS  (PRN):  acetaminophen   Tablet 650milliGRAM(s) Oral every 6 hours PRN For Temp greater than 38.5 C (101.3 F)  acetaminophen   Tablet. 650milliGRAM(s) Oral every 6 hours PRN Mild Pain (1 - 3)  dextrose Gel 1Dose(s) Oral once PRN Blood Glucose LESS THAN 70 milliGRAM(s)/deciliter  glucagon  Injectable 1milliGRAM(s) IntraMuscular once PRN Glucose LESS THAN 70 milligrams/deciliter      Vital Signs Last 24 Hrs  T(C): 37.6, Max: 39.3 ( @ 21:26)  T(F): 99.6, Max: 102.8 ( @ 21:26)  HR: 81 (81 - 96)  BP: 104/52 (104/52 - 152/58)  BP(mean): --  RR: 18 (18 - 19)  SpO2: 95% (93% - 96%)    Physical Exam:    Constitutional: frail looking  HEENT: NC/AT, EOMI, PERRLA  Neck: supple  Back: no tenderness  Respiratory: clear  Cardiovascular: S1S2 regular, no murmurs  Abdomen: soft, RUQ abdomen tender, not distended, positive BS  Genitourinary: deferred  Rectal: deferred  Musculoskeletal: no muscle tenderness, no joint swelling or tenderness  Extremities: no pedal edema  Neurological: AxOx3, moving all extremities, no focal deficits  Skin: no rashes    Labs:                        11.5   15.2  )-----------( 204      ( 24 May 2017 05:55 )             33.5         138  |  107  |  10  ----------------------------<  141<H>  4.2   |  22  |  1.06    Ca    7.8<L>      24 May 2017 05:55    TPro  6.8  /  Alb  2.8<L>  /  TBili  1.6<H>  /  DBili  x   /  AST  24  /  ALT  22  /  AlkPhos  86             Cultures:              10.6   5.6   )-----------( 209      ( 23 May 2017 06:23 )             33.4         143  |  111<H>  |  11  ----------------------------<  129<H>  5.0   |  23  |  0.99    Ca    8.1<L>      23 May 2017 06:23  Mg     1.7         TPro  6.8  /  Alb  2.8<L>  /  TBili  1.1  /  DBili  0.2  /  AST  18  /  ALT  22  /  AlkPhos  83                 12.3   7.7   )-----------( 272      ( 21 May 2017 22:34 )             38.0         138  |  108  |  20  ----------------------------<  143<H>  5.2   |  24  |  1.34<H>    Ca    8.3<L>      21 May 2017 22:34  Mg     1.7         TPro  8.2  /  Alb  3.5  /  TBili  0.6  /  DBili  x   /  AST  26  /  ALT  26  /  AlkPhos  117       LIVER FUNCTIONS - ( 21 May 2017 22:34 )  Alb: 3.5 g/dL / Pro: 8.2 gm/dL / ALK PHOS: 117 U/L / ALT: 26 U/L / AST: 26 U/L / GGT: x           Urinalysis Basic - ( 22 May 2017 02:04 )    Color: Yellow / Appearance: Clear / S.010 / pH: x  Gluc: x / Ketone: Negative  / Bili: Negative / Urobili: Negative mg/dL   Blood: x / Protein: Negative mg/dL / Nitrite: Negative   Leuk Esterase: Negative / RBC: 3-5 /HPF / WBC 0-2   Sq Epi: x / Non Sq Epi: Negative / Bacteria: Occasional      Radiology:    CT abdomen and pelvis:   Gallstones and/or sludge with gallstones suggested in the gallbladder   neck. Predominantly pericholecystic inflammatory change, increased from   prior study. Findings could represent acute cholecystitis. Consider   further evaluation with nuclear medicine DISIDA scan which is more   sensitive for the detection of such diagnosis.    HIDA:  Abnormal morphine-augmented hepatobiliary scan.  Findings consistent with acute cholecystitis.    Advanced directives addressed: full resuscitation

## 2017-05-25 ENCOUNTER — RESULT REVIEW (OUTPATIENT)
Age: 82
End: 2017-05-25

## 2017-05-25 DIAGNOSIS — K80.00 CALCULUS OF GALLBLADDER WITH ACUTE CHOLECYSTITIS WITHOUT OBSTRUCTION: ICD-10-CM

## 2017-05-25 LAB
ALBUMIN SERPL ELPH-MCNC: 2.1 G/DL — LOW (ref 3.3–5)
ALBUMIN SERPL ELPH-MCNC: 2.4 G/DL — LOW (ref 3.3–5)
ALP SERPL-CCNC: 65 U/L — SIGNIFICANT CHANGE UP (ref 40–120)
ALP SERPL-CCNC: 81 U/L — SIGNIFICANT CHANGE UP (ref 40–120)
ALT FLD-CCNC: 16 U/L — SIGNIFICANT CHANGE UP (ref 12–78)
ALT FLD-CCNC: 18 U/L — SIGNIFICANT CHANGE UP (ref 12–78)
ANION GAP SERPL CALC-SCNC: 12 MMOL/L — SIGNIFICANT CHANGE UP (ref 5–17)
ANION GAP SERPL CALC-SCNC: 9 MMOL/L — SIGNIFICANT CHANGE UP (ref 5–17)
ANISOCYTOSIS BLD QL: SLIGHT — SIGNIFICANT CHANGE UP
APTT BLD: 39.8 SEC — HIGH (ref 27.5–37.4)
AST SERPL-CCNC: 15 U/L — SIGNIFICANT CHANGE UP (ref 15–37)
AST SERPL-CCNC: 49 U/L — HIGH (ref 15–37)
BASO STIPL BLD QL SMEAR: SLIGHT — SIGNIFICANT CHANGE UP
BASOPHILS # BLD AUTO: 0.1 K/UL — SIGNIFICANT CHANGE UP (ref 0–0.2)
BILIRUB SERPL-MCNC: 0.8 MG/DL — SIGNIFICANT CHANGE UP (ref 0.2–1.2)
BILIRUB SERPL-MCNC: 1.1 MG/DL — SIGNIFICANT CHANGE UP (ref 0.2–1.2)
BLD GP AB SCN SERPL QL: SIGNIFICANT CHANGE UP
BUN SERPL-MCNC: 14 MG/DL — SIGNIFICANT CHANGE UP (ref 7–23)
BUN SERPL-MCNC: 16 MG/DL — SIGNIFICANT CHANGE UP (ref 7–23)
CALCIUM SERPL-MCNC: 6.4 MG/DL — CRITICAL LOW (ref 8.5–10.1)
CALCIUM SERPL-MCNC: 7.3 MG/DL — LOW (ref 8.5–10.1)
CHLORIDE SERPL-SCNC: 107 MMOL/L — SIGNIFICANT CHANGE UP (ref 96–108)
CHLORIDE SERPL-SCNC: 111 MMOL/L — HIGH (ref 96–108)
CK SERPL-CCNC: 150 U/L — SIGNIFICANT CHANGE UP (ref 26–192)
CO2 SERPL-SCNC: 18 MMOL/L — LOW (ref 22–31)
CO2 SERPL-SCNC: 18 MMOL/L — LOW (ref 22–31)
CREAT SERPL-MCNC: 1.29 MG/DL — SIGNIFICANT CHANGE UP (ref 0.5–1.3)
CREAT SERPL-MCNC: 1.7 MG/DL — HIGH (ref 0.5–1.3)
EOSINOPHIL # BLD AUTO: 0 K/UL — SIGNIFICANT CHANGE UP (ref 0–0.5)
GLUCOSE SERPL-MCNC: 116 MG/DL — HIGH (ref 70–99)
GLUCOSE SERPL-MCNC: 119 MG/DL — HIGH (ref 70–99)
HCT VFR BLD CALC: 22.6 % — LOW (ref 34.5–45)
HCT VFR BLD CALC: 29.5 % — LOW (ref 34.5–45)
HGB BLD-MCNC: 10.5 G/DL — LOW (ref 11.5–15.5)
HGB BLD-MCNC: 7.8 G/DL — LOW (ref 11.5–15.5)
HYPOCHROMIA BLD QL: SLIGHT — SIGNIFICANT CHANGE UP
INR BLD: 1.81 RATIO — HIGH (ref 0.88–1.16)
INR BLD: 1.84 RATIO — HIGH (ref 0.88–1.16)
LIDOCAIN IGE QN: 93 U/L — SIGNIFICANT CHANGE UP (ref 73–393)
LYMPHOCYTES # BLD AUTO: 0.4 K/UL — LOW (ref 1–3.3)
LYMPHOCYTES # BLD AUTO: 4 % — LOW (ref 13–44)
MACROCYTES BLD QL: SLIGHT — SIGNIFICANT CHANGE UP
MANUAL DIF COMMENT BLD-IMP: SIGNIFICANT CHANGE UP
MCHC RBC-ENTMCNC: 34.4 PG — HIGH (ref 27–34)
MCHC RBC-ENTMCNC: 34.6 GM/DL — SIGNIFICANT CHANGE UP (ref 32–36)
MCHC RBC-ENTMCNC: 34.7 PG — HIGH (ref 27–34)
MCHC RBC-ENTMCNC: 35.7 GM/DL — SIGNIFICANT CHANGE UP (ref 32–36)
MCV RBC AUTO: 97.2 FL — SIGNIFICANT CHANGE UP (ref 80–100)
MCV RBC AUTO: 99.4 FL — SIGNIFICANT CHANGE UP (ref 80–100)
MICROCYTES BLD QL: SLIGHT — SIGNIFICANT CHANGE UP
MONOCYTES # BLD AUTO: 0.5 K/UL — SIGNIFICANT CHANGE UP (ref 0–0.9)
MONOCYTES NFR BLD AUTO: 1 % — LOW (ref 2–14)
NEUTROPHILS # BLD AUTO: 11.5 K/UL — HIGH (ref 1.8–7.4)
NEUTROPHILS NFR BLD AUTO: 88 % — HIGH (ref 43–77)
NEUTS BAND # BLD: 7 % — SIGNIFICANT CHANGE UP (ref 0–8)
OVALOCYTES BLD QL SMEAR: SLIGHT — SIGNIFICANT CHANGE UP
PLAT MORPH BLD: NORMAL — SIGNIFICANT CHANGE UP
PLATELET # BLD AUTO: 163 K/UL — SIGNIFICANT CHANGE UP (ref 150–400)
PLATELET # BLD AUTO: 176 K/UL — SIGNIFICANT CHANGE UP (ref 150–400)
POIKILOCYTOSIS BLD QL AUTO: SLIGHT — SIGNIFICANT CHANGE UP
POLYCHROMASIA BLD QL SMEAR: SLIGHT — SIGNIFICANT CHANGE UP
POTASSIUM SERPL-MCNC: 3.8 MMOL/L — SIGNIFICANT CHANGE UP (ref 3.5–5.3)
POTASSIUM SERPL-MCNC: 4 MMOL/L — SIGNIFICANT CHANGE UP (ref 3.5–5.3)
POTASSIUM SERPL-SCNC: 3.8 MMOL/L — SIGNIFICANT CHANGE UP (ref 3.5–5.3)
POTASSIUM SERPL-SCNC: 4 MMOL/L — SIGNIFICANT CHANGE UP (ref 3.5–5.3)
PROT SERPL-MCNC: 5.5 GM/DL — LOW (ref 6–8.3)
PROT SERPL-MCNC: 6.3 GM/DL — SIGNIFICANT CHANGE UP (ref 6–8.3)
PROTHROM AB SERPL-ACNC: 19.8 SEC — HIGH (ref 9.8–12.7)
PROTHROM AB SERPL-ACNC: 20.1 SEC — HIGH (ref 9.8–12.7)
RBC # BLD: 2.27 M/UL — LOW (ref 3.8–5.2)
RBC # BLD: 3.03 M/UL — LOW (ref 3.8–5.2)
RBC # FLD: 12.2 % — SIGNIFICANT CHANGE UP (ref 10.3–14.5)
RBC # FLD: 12.4 % — SIGNIFICANT CHANGE UP (ref 10.3–14.5)
RBC BLD AUTO: (no result)
SODIUM SERPL-SCNC: 137 MMOL/L — SIGNIFICANT CHANGE UP (ref 135–145)
SODIUM SERPL-SCNC: 138 MMOL/L — SIGNIFICANT CHANGE UP (ref 135–145)
TARGETS BLD QL SMEAR: SLIGHT — SIGNIFICANT CHANGE UP
TROPONIN I SERPL-MCNC: <0.015 NG/ML — SIGNIFICANT CHANGE UP (ref 0.01–0.04)
TYPE + AB SCN PNL BLD: SIGNIFICANT CHANGE UP
WBC # BLD: 12.5 K/UL — HIGH (ref 3.8–10.5)
WBC # BLD: 14.2 K/UL — HIGH (ref 3.8–10.5)
WBC # FLD AUTO: 12.5 K/UL — HIGH (ref 3.8–10.5)
WBC # FLD AUTO: 14.2 K/UL — HIGH (ref 3.8–10.5)

## 2017-05-25 PROCEDURE — 88304 TISSUE EXAM BY PATHOLOGIST: CPT | Mod: 26

## 2017-05-25 PROCEDURE — 71010: CPT | Mod: 26,77

## 2017-05-25 PROCEDURE — 71010: CPT | Mod: 26

## 2017-05-25 RX ORDER — METRONIDAZOLE 500 MG
500 TABLET ORAL EVERY 8 HOURS
Qty: 0 | Refills: 0 | Status: DISCONTINUED | OUTPATIENT
Start: 2017-05-26 | End: 2017-05-26

## 2017-05-25 RX ORDER — OXYCODONE HYDROCHLORIDE 5 MG/1
5 TABLET ORAL EVERY 6 HOURS
Qty: 0 | Refills: 0 | Status: DISCONTINUED | OUTPATIENT
Start: 2017-05-25 | End: 2017-05-28

## 2017-05-25 RX ORDER — METRONIDAZOLE 500 MG
TABLET ORAL
Qty: 0 | Refills: 0 | Status: DISCONTINUED | OUTPATIENT
Start: 2017-05-26 | End: 2017-05-26

## 2017-05-25 RX ORDER — SODIUM CHLORIDE 9 MG/ML
1000 INJECTION INTRAMUSCULAR; INTRAVENOUS; SUBCUTANEOUS
Qty: 0 | Refills: 0 | Status: DISCONTINUED | OUTPATIENT
Start: 2017-05-25 | End: 2017-05-26

## 2017-05-25 RX ORDER — ACETAMINOPHEN 500 MG
1000 TABLET ORAL ONCE
Qty: 0 | Refills: 0 | Status: COMPLETED | OUTPATIENT
Start: 2017-05-25 | End: 2017-05-25

## 2017-05-25 RX ORDER — SODIUM CHLORIDE 9 MG/ML
1000 INJECTION INTRAMUSCULAR; INTRAVENOUS; SUBCUTANEOUS
Qty: 0 | Refills: 0 | Status: DISCONTINUED | OUTPATIENT
Start: 2017-05-25 | End: 2017-05-25

## 2017-05-25 RX ORDER — VANCOMYCIN HCL 1 G
1000 VIAL (EA) INTRAVENOUS ONCE
Qty: 0 | Refills: 0 | Status: COMPLETED | OUTPATIENT
Start: 2017-05-25 | End: 2017-05-26

## 2017-05-25 RX ORDER — AZTREONAM 2 G
1000 VIAL (EA) INJECTION EVERY 8 HOURS
Qty: 0 | Refills: 0 | Status: DISCONTINUED | OUTPATIENT
Start: 2017-05-25 | End: 2017-05-26

## 2017-05-25 RX ORDER — AZTREONAM 2 G
VIAL (EA) INJECTION
Qty: 0 | Refills: 0 | Status: DISCONTINUED | OUTPATIENT
Start: 2017-05-25 | End: 2017-05-26

## 2017-05-25 RX ORDER — METRONIDAZOLE 500 MG
500 TABLET ORAL ONCE
Qty: 0 | Refills: 0 | Status: COMPLETED | OUTPATIENT
Start: 2017-05-25 | End: 2017-05-26

## 2017-05-25 RX ORDER — SODIUM CHLORIDE 9 MG/ML
500 INJECTION INTRAMUSCULAR; INTRAVENOUS; SUBCUTANEOUS ONCE
Qty: 0 | Refills: 0 | Status: COMPLETED | OUTPATIENT
Start: 2017-05-25 | End: 2017-05-25

## 2017-05-25 RX ORDER — ACETAMINOPHEN 500 MG
1000 TABLET ORAL ONCE
Qty: 0 | Refills: 0 | Status: DISCONTINUED | OUTPATIENT
Start: 2017-05-25 | End: 2017-05-25

## 2017-05-25 RX ORDER — HYDROMORPHONE HYDROCHLORIDE 2 MG/ML
1 INJECTION INTRAMUSCULAR; INTRAVENOUS; SUBCUTANEOUS EVERY 4 HOURS
Qty: 0 | Refills: 0 | Status: DISCONTINUED | OUTPATIENT
Start: 2017-05-25 | End: 2017-06-01

## 2017-05-25 RX ORDER — FENTANYL CITRATE 50 UG/ML
50 INJECTION INTRAVENOUS
Qty: 0 | Refills: 0 | Status: DISCONTINUED | OUTPATIENT
Start: 2017-05-25 | End: 2017-05-25

## 2017-05-25 RX ORDER — HEPARIN SODIUM 5000 [USP'U]/ML
5000 INJECTION INTRAVENOUS; SUBCUTANEOUS EVERY 8 HOURS
Qty: 0 | Refills: 0 | Status: DISCONTINUED | OUTPATIENT
Start: 2017-05-26 | End: 2017-05-30

## 2017-05-25 RX ORDER — AZTREONAM 2 G
1000 VIAL (EA) INJECTION ONCE
Qty: 0 | Refills: 0 | Status: COMPLETED | OUTPATIENT
Start: 2017-05-25 | End: 2017-05-25

## 2017-05-25 RX ADMIN — FENTANYL CITRATE 50 MICROGRAM(S): 50 INJECTION INTRAVENOUS at 19:47

## 2017-05-25 RX ADMIN — Medication 400 MILLIGRAM(S): at 19:47

## 2017-05-25 RX ADMIN — Medication 650 MILLIGRAM(S): at 05:24

## 2017-05-25 RX ADMIN — Medication 50 MILLIGRAM(S): at 05:35

## 2017-05-25 RX ADMIN — SODIUM CHLORIDE 75 MILLILITER(S): 9 INJECTION INTRAMUSCULAR; INTRAVENOUS; SUBCUTANEOUS at 19:46

## 2017-05-25 RX ADMIN — Medication 50 MILLIGRAM(S): at 13:25

## 2017-05-25 RX ADMIN — SODIUM CHLORIDE 1000 MILLILITER(S): 9 INJECTION INTRAMUSCULAR; INTRAVENOUS; SUBCUTANEOUS at 20:13

## 2017-05-25 RX ADMIN — Medication 100 MILLIGRAM(S): at 05:35

## 2017-05-25 RX ADMIN — Medication 50 MILLIGRAM(S): at 23:18

## 2017-05-25 NOTE — PHYSICAL THERAPY INITIAL EVALUATION ADULT - GENERAL OBSERVATIONS, REHAB EVAL
Patient received in bed on 5S, grand-daughter and relative in room.  Patient speaks [place]ian only, GD to interpret.

## 2017-05-25 NOTE — PROGRESS NOTE ADULT - SUBJECTIVE AND OBJECTIVE BOX
5/25- OOB to chair- as per granddaughter patient complaining of cough and chills- febrile overnight and this morning- also complaining of generalzed abdominal pain     Vital Signs Last 24 Hrs  T(C): 36.2, Max: 39.5 (05-25 @ 05:10)  T(F): 97.2, Max: 103.1 (05-25 @ 05:10)  HR: 92 (84 - 107)  BP: 122/39 (108/58 - 122/39)  BP(mean): --  RR: 17 (16 - 18)  SpO2: 97% (93% - 100%)    ROS:   All 10 systems reviewed and found to be negative with the exception of what has been described above.    PE:  Constitutional: NAD, OOB to chair  HEENT: NC/AT, EOMI, PERRLA  Neck: supple  Back: no tenderness  Respiratory: LCTA  Cardiovascular: S1S2 regular, no murmurs  Abdomen: obese- Left upper quadrant tenderness +BS soft  Genitourinary: voiding  Rectal: deferred  Musculoskeletal: no muscle tenderness, no joint swelling or tenderness  Extremities: no pedal edema   Neurological: no focal deficits                           10.5   14.2  )-----------( 176      ( 25 May 2017 06:01 )             29.5   05-25    137  |  107  |  14  ----------------------------<  116<H>  3.8   |  18<L>  |  1.29    Ca    7.3<L>      25 May 2017 06:01    TPro  6.3  /  Alb  2.4<L>  /  TBili  1.1  /  DBili  x   /  AST  15  /  ALT  16  /  AlkPhos  65  05-25                            11.5   15.2  )-----------( 204      ( 24 May 2017 05:55 )             33.5     05-24    138  |  107  |  10  ----------------------------<  141<H>  4.2   |  22  |  1.06    Ca    7.8<L>      24 May 2017 05:55    TPro  6.8  /  Alb  2.8<L>  /  TBili  1.6<H>  /  DBili  x   /  AST  24  /  ALT  22  /  AlkPhos  86  05-24                          10.6   5.6   )-----------( 209      ( 23 May 2017 06:23 )             33.4   05-23    143  |  111<H>  |  11  ----------------------------<  129<H>  5.0   |  23  |  0.99    Ca    8.1<L>      23 May 2017 06:23  Mg     1.7     05-21    TPro  6.8  /  Alb  2.8<L>  /  TBili  1.1  /  DBili  0.2  /  AST  18  /  ALT  22  /  AlkPhos  83  05-23      EXAM:  NM HEPATOBILIARY IMG                        PROCEDURE DATE:  05/22/2017    INTERPRETATION:  CLINICAL INFORMATION: 87-year-old female with gallstone   pancreatitis, evaluate for acute cholecystitis.  RADIOPHARMACEUTICAL: 3.3 mCi Tc-99m-Mebrofenin, I.V.; 2 doses  TECHNIQUE:  Dynamic imaging of the anterior abdomen was performed for 2   hours after the injection of radiotracer followed by static images of the   abdomen in the anterior, right lateral and right anterior oblique   projections.  Morphine 3.3 mg I.V. and a second dose of radiotracer were   administered at 1 hour.    COMPARISON: No prior hepatobiliary scan available for comparison.  FINDINGS: There is prompt, homogeneous uptake of radiotracer by the   hepatocytes. Activity is first seen in the bowel at 20 minutes. The   gallbladder is not visualized at any time during the study, despite   administration of morphine. There is good clearance of activity from the   liver at the end of the study.  IMPRESSION: Abnormal morphine-augmented hepatobiliary scan.  Findings consistent with acute cholecystitis.      CT ABDOMEN AND PELVIS  IMPRESSION:  Gallstones and/or sludge with gallstones suggested in the gallbladder   neck. Predominantly pericholecystic inflammatory change, increased from   prior study. Findings could represent acute cholecystitis. Consider   further evaluation with nuclear medicine DISIDA scan which is more   sensitive for the detection of such diagnosis.

## 2017-05-25 NOTE — PROGRESS NOTE ADULT - ASSESSMENT
# Acute Cholecystitis.  - Leukocytosis improving  - febrile overnight, +cough, wheezing - BC sent - check Cxray- start nebulizer treatments  -No evidence of pancreatitis (mild lipase elevation on admission which has resolved and no CT evidence of pancreatitis).  -Lactatemia- now resolved with IVF hydration  -f/u BCx- NGTD  -full liquid diet- NPO tonight after midnight for possible OR in AM  -HIDA scan c/w cholecystitis  -c/w ciprofloxacin + metronidazole D#3- change antibiotic to aztreonam 2gevery hours- patient spiking despite cipro and flagyl   -surgery consult appreciated as per note patient would to speak with Dr Pettit first prior to agreeing with surgery  - GI consult with Dr Pettit  - needs cardiology clearance prior to surgery    # nonspecific EKG changes.  -at current time, asymptomatic for s/sx of ischemic heart disease.  -V2-V3 new nonspecific changes as compared to 04/2016.    # hx HTN.  -c/w losartan.    # hx hyperlipidemia.  -c/w statin.    # Type 2DM.  - a1c 6.1  - ADA.  - correction insulin coverage.      Case discussed with Dr Shelley. NPO tonight for possible OR in am - will need cardiology clearance prior. Plan explained to patient and family at the bedside

## 2017-05-25 NOTE — BRIEF OPERATIVE NOTE - PRE-OP DX
Cholecystitis, acute  05/25/2017    Lalo Johnson  Sepsis, due to unspecified organism  05/25/2017    Lalo Johnson

## 2017-05-25 NOTE — PROVIDER CONTACT NOTE (OTHER) - ACTION/TREATMENT ORDERED:
No new orders received. PT has recent blood culture, is on abx, and has IV fluids infusing. Will continue to monitor.

## 2017-05-25 NOTE — PROGRESS NOTE ADULT - SUBJECTIVE AND OBJECTIVE BOX
CC:Patient is a 87y old  Female who presents with a chief complaint of abdominal pain. (22 May 2017 10:30)      Subjective:  Pt seen and examined at bedside with chaperone/. Pt is AAOx3, pt in no acute distress. Pt c/o chills, abdominal pain, fever. Pt denied c/o chest pain, SOB, N/V/D, extremity pain or dysfunction, hemoptysis, hematemesis, hematuria, hematochexia, headache, diplopia, vertigo, dizzyness. Pt tolerating diet, (+) void, (+) ambulation, (+) bowel function    ROS:  abd pain, chills, fever    Vital Signs Last 24 Hrs  T(C): 39.2, Max: 39.5 (05-25 @ 05:10)  T(F): 102.5, Max: 103.1 (05-25 @ 05:10)  HR: 92 (84 - 107)  BP: 122/39 (108/58 - 122/39)  BP(mean): --  RR: 17 (16 - 18)  SpO2: 97% (93% - 100%)    Labs:                        10.5   14.2  )-----------( 176      ( 25 May 2017 06:01 )             29.5     CBC Full  -  ( 25 May 2017 06:01 )  WBC Count : 14.2 K/uL  Hemoglobin : 10.5 g/dL  Hematocrit : 29.5 %  Platelet Count - Automated : 176 K/uL  Mean Cell Volume : 97.2 fl  Mean Cell Hemoglobin : 34.7 pg  Mean Cell Hemoglobin Concentration : 35.7 gm/dL  Auto Neutrophil # : x  Auto Lymphocyte # : x  Auto Monocyte # : x  Auto Eosinophil # : x  Auto Basophil # : x  Auto Neutrophil % : x  Auto Lymphocyte % : x  Auto Monocyte % : x  Auto Eosinophil % : x  Auto Basophil % : x    05-25    137  |  107  |  14  ----------------------------<  116<H>  3.8   |  18<L>  |  1.29    Ca    7.3<L>      25 May 2017 06:01    TPro  6.3  /  Alb  2.4<L>  /  TBili  1.1  /  DBili  x   /  AST  15  /  ALT  16  /  AlkPhos  65  05-25    LIVER FUNCTIONS - ( 25 May 2017 06:01 )  Alb: 2.4 g/dL / Pro: 6.3 gm/dL / ALK PHOS: 65 U/L / ALT: 16 U/L / AST: 15 U/L / GGT: x           PT/INR - ( 25 May 2017 06:01 )   PT: 20.1 sec;   INR: 1.84 ratio               Meds:  heparin  Injectable 5000Unit(s) SubCutaneous every 12 hours  sodium chloride 0.9%. 1000milliLiter(s) IV Continuous <Continuous>  acetaminophen   Tablet 650milliGRAM(s) Oral every 6 hours PRN  acetaminophen   Tablet. 650milliGRAM(s) Oral every 6 hours PRN  docusate sodium 100milliGRAM(s) Oral three times a day  insulin lispro (HumaLOG) corrective regimen sliding scale  SubCutaneous three times a day before meals  insulin lispro (HumaLOG) corrective regimen sliding scale  SubCutaneous at bedtime  dextrose 5%. 1000milliLiter(s) IV Continuous <Continuous>  dextrose Gel 1Dose(s) Oral once PRN  dextrose 50% Injectable 12.5Gram(s) IV Push once  dextrose 50% Injectable 25Gram(s) IV Push once  dextrose 50% Injectable 25Gram(s) IV Push once  glucagon  Injectable 1milliGRAM(s) IntraMuscular once PRN  aspirin 325milliGRAM(s) Oral daily  losartan 25milliGRAM(s) Oral daily  atorvastatin 10milliGRAM(s) Oral at bedtime  metoprolol succinate ER 50milliGRAM(s) Oral daily  polyethylene glycol 3350 17Gram(s) Oral daily  pantoprazole    Tablet 40milliGRAM(s) Oral before breakfast  multivitamin 1Tablet(s) Oral daily  aztreonam  IVPB 1000milliGRAM(s) IV Intermittent once  aztreonam  IVPB 1000milliGRAM(s) IV Intermittent every 8 hours  aztreonam  IVPB  IV Intermittent       Radiology:  EXAM:  NM HEPATOBILIARY IMG                            PROCEDURE DATE:  05/22/2017        INTERPRETATION:  CLINICAL INFORMATION: 87-year-old female with gallstone   pancreatitis, evaluate for acute cholecystitis.    RADIOPHARMACEUTICAL: 3.3 mCi Tc-99m-Mebrofenin, I.V.; 2 doses    TECHNIQUE:  Dynamic imaging of the anterior abdomen was performed for 2   hours after the injection of radiotracer followed by static images of the   abdomen in the anterior, right lateral and right anterior oblique   projections.  Morphine 3.3 mg I.V. and a second dose of radiotracer were   administered at 1 hour.      COMPARISON: No prior hepatobiliary scan available for comparison.    FINDINGS: There is prompt, homogeneous uptake of radiotracer by the   hepatocytes. Activity is first seen in the bowel at 20 minutes. The   gallbladder is not visualized at any time during the study, despite   administration of morphine. There is good clearance of activity from the   liver at the end of the study.    IMPRESSION: Abnormal morphine-augmented hepatobiliary scan.    Findings consistent with acute cholecystitis.    Dr. Thomas Damico was notified of these results by telephone, on   5/22/2017, at about 2:50 p.m., with read back.      Physical exam:  Pt is aaox3  Pt in no acute distress  Resp: CTAB  CVS: S1S2(+)  ABD: bowel sounds (+), soft, non distended, no rebound, no guarding, no rigidity, no skin changes to exam. (+) right upper quadrant tenderness to exam  EXT: no calf tenderness or edema to exam b/l, on VTE prophylaxis  Skin: no gross adverse skin changes to exam

## 2017-05-25 NOTE — PROGRESS NOTE ADULT - SUBJECTIVE AND OBJECTIVE BOX
Pt seen and examined at bedside with chaperone and . Pt is AAOx3, pt in no acute distress. Pt has acute cholecystitis. Pt explained the surgical procedures in both medical terminology and in lay terms that the patient understood, laparascopic possible open cholecystectomy. Pt explained in both medical terminology and in lay terms that the patient understood, the benefits and alternatives of surgery including non-operative management. Pt explained at length in both medical terminology and in lay terms that the patient understood, the associated risks of surgery including but not limited to infection, bleeding, nerve/organ injury, postoperative pain, poor wound healing, scar formation both internally and externally, risk of seroma/hematoma/abcess/biloma formation, risk of hernia development, risk of injury to liver and/or biliary tree/ductal system, risk of need for further GI/IR/Surgery intervention as required. Pt explained in both medical terminology and in lay terms that the patient understood, the HIGH associated yuko and post operative risks of cardiac/cns/respiratory insult or injury, risk of death. Pt understood all of the above. All questions were answered. Pt gave informed consent for surgery. Pt seen and examined at bedside with chaperone and (#[id number]). Pt is AAOx3, pt in no acute distress. Pt has acute cholecystitis. Pt explained the surgical procedures in both medical terminology and in lay terms that the patient understood, laparascopic possible open cholecystectomy, any surgeries deemed medically necessary. Pt explained in both medical terminology and in lay terms that the patient understood, the benefits and alternatives of surgery including non-operative management. Pt explained at length in both medical terminology and in lay terms that the patient understood, the associated risks of surgery including but not limited to infection, bleeding, nerve/organ injury, postoperative pain, poor wound healing, scar formation both internally and externally, risk of seroma/hematoma/abcess/biloma formation, risk of hernia development, risk of injury to liver and/or biliary tree/ductal system, risk of need for further GI/IR/Surgery intervention as required. Pt explained in both medical terminology and in lay terms that the patient understood, the HIGH associated yuko and post operative risks of cardiac/cns/respiratory insult or injury, risk of death. Pt understood all of the above. All questions were answered. Pt gave informed consent for surgery.

## 2017-05-25 NOTE — PROGRESS NOTE ADULT - ASSESSMENT
A/P:  Acute cholecystitis  NPO, IV hydration  GI/DVT prophylaxis  Pain control  Incentive spirometry  Serial abd exams  Pt for operative intervention of choelcystectomy  F/U labs  Transfuse blood products as required  Pt and pt's nephewFrancisco [phone], aware of all of the above and agree with assessment and plan  Pt aware of and agrees with all of the above

## 2017-05-25 NOTE — PROGRESS NOTE ADULT - SUBJECTIVE AND OBJECTIVE BOX
Patient is a 87y old  Female who presents with a chief complaint of abdominal pain. (22 May 2017 10:30)    HPI:  88 y/o Female with h/o DM type 2, HL, HTN was admitted on  for worsening abdominal pain. She has left flank pain which wraps around to right side beneath diaphragm. In ED she received Zosyn.  US (+) gallstone and sludge.  CT AB/pelvis (+) gallstone and sludge in gallbladder neck.  (+) inflammation.  lactate 2.6.      Abdominal pain is controlled  Febrile to 102.5F  Poor appetite  For surgery    MEDICATIONS  (STANDING):  heparin  Injectable 5000Unit(s) SubCutaneous every 12 hours  sodium chloride 0.9%. 1000milliLiter(s) IV Continuous <Continuous>  docusate sodium 100milliGRAM(s) Oral three times a day  insulin lispro (HumaLOG) corrective regimen sliding scale  SubCutaneous three times a day before meals  insulin lispro (HumaLOG) corrective regimen sliding scale  SubCutaneous at bedtime  dextrose 5%. 1000milliLiter(s) IV Continuous <Continuous>  dextrose 50% Injectable 12.5Gram(s) IV Push once  dextrose 50% Injectable 25Gram(s) IV Push once  dextrose 50% Injectable 25Gram(s) IV Push once  aspirin 325milliGRAM(s) Oral daily  losartan 25milliGRAM(s) Oral daily  atorvastatin 10milliGRAM(s) Oral at bedtime  metoprolol succinate ER 50milliGRAM(s) Oral daily  polyethylene glycol 3350 17Gram(s) Oral daily  pantoprazole    Tablet 40milliGRAM(s) Oral before breakfast  multivitamin 1Tablet(s) Oral daily  aztreonam  IVPB 1000milliGRAM(s) IV Intermittent every 8 hours  aztreonam  IVPB  IV Intermittent     MEDICATIONS  (PRN):  acetaminophen   Tablet 650milliGRAM(s) Oral every 6 hours PRN For Temp greater than 38.5 C (101.3 F)  acetaminophen   Tablet. 650milliGRAM(s) Oral every 6 hours PRN Mild Pain (1 - 3)  dextrose Gel 1Dose(s) Oral once PRN Blood Glucose LESS THAN 70 milliGRAM(s)/deciliter  glucagon  Injectable 1milliGRAM(s) IntraMuscular once PRN Glucose LESS THAN 70 milligrams/deciliter      Vital Signs Last 24 Hrs  T(C): 38.7, Max: 39.5 ( @ 05:10)  T(F): 101.7, Max: 103.1 ( @ 05:10)  HR: 92 (84 - 107)  BP: 122/39 (108/58 - 122/39)  BP(mean): --  RR: 17 (16 - 18)  SpO2: 97% (93% - 100%)    Physical Exam:    Constitutional: frail looking  HEENT: NC/AT, EOMI, PERRLA  Neck: supple  Back: no tenderness  Respiratory: clear  Cardiovascular: S1S2 regular, no murmurs  Abdomen: soft, RUQ abdomen tender, not distended, positive BS  Genitourinary: deferred  Rectal: deferred  Musculoskeletal: no muscle tenderness, no joint swelling or tenderness  Extremities: no pedal edema  Neurological: AxOx3, moving all extremities, no focal deficits  Skin: no rashes    Labs:                        11.5   15.2  )-----------( 204      ( 24 May 2017 05:55 )             33.5         138  |  107  |  10  ----------------------------<  141<H>  4.2   |  22  |  1.06    Ca    7.8<L>      24 May 2017 05:55    TPro  6.8  /  Alb  2.8<L>  /  TBili  1.6<H>  /  DBili  x   /  AST  24  /  ALT  22  /  AlkPhos  86             Cultures:              10.6   5.6   )-----------( 209      ( 23 May 2017 06:23 )             33.4         143  |  111<H>  |  11  ----------------------------<  129<H>  5.0   |  23  |  0.99    Ca    8.1<L>      23 May 2017 06:23  Mg     1.7         TPro  6.8  /  Alb  2.8<L>  /  TBili  1.1  /  DBili  0.2  /  AST  18  /  ALT  22  /  AlkPhos  83                 12.3   7.7   )-----------( 272      ( 21 May 2017 22:34 )             38.0         138  |  108  |  20  ----------------------------<  143<H>  5.2   |  24  |  1.34<H>    Ca    8.3<L>      21 May 2017 22:34  Mg     1.7         TPro  8.2  /  Alb  3.5  /  TBili  0.6  /  DBili  x   /  AST  26  /  ALT  26  /  AlkPhos  117       LIVER FUNCTIONS - ( 21 May 2017 22:34 )  Alb: 3.5 g/dL / Pro: 8.2 gm/dL / ALK PHOS: 117 U/L / ALT: 26 U/L / AST: 26 U/L / GGT: x           Urinalysis Basic - ( 22 May 2017 02:04 )    Color: Yellow / Appearance: Clear / S.010 / pH: x  Gluc: x / Ketone: Negative  / Bili: Negative / Urobili: Negative mg/dL   Blood: x / Protein: Negative mg/dL / Nitrite: Negative   Leuk Esterase: Negative / RBC: 3-5 /HPF / WBC 0-2   Sq Epi: x / Non Sq Epi: Negative / Bacteria: Occasional      Radiology:    CT abdomen and pelvis:   Gallstones and/or sludge with gallstones suggested in the gallbladder   neck. Predominantly pericholecystic inflammatory change, increased from   prior study. Findings could represent acute cholecystitis. Consider   further evaluation with nuclear medicine DISIDA scan which is more   sensitive for the detection of such diagnosis.    HIDA:  Abnormal morphine-augmented hepatobiliary scan.  Findings consistent with acute cholecystitis.    Advanced directives addressed: full resuscitation

## 2017-05-25 NOTE — PROGRESS NOTE ADULT - SUBJECTIVE AND OBJECTIVE BOX
CHIEF COMPLAINT:  sepsis    SUBJECTIVE:   still febrile. agreeable to surgery. mild SOB. no previous history of MI.      REVIEW OF SYSTEMS:  All other review of systems is negative unless indicated above    Vital Signs Last 24 Hrs  T(C): 34.7, Max: 39.5 (05-25 @ 05:10)  T(F): 94.4, Max: 103.1 (05-25 @ 05:10)  HR: 116 (84 - 116)  BP: 98/50 (98/50 - 122/39)  BP(mean): --  RR: 17 (16 - 18)  SpO2: 95% (93% - 100%)    I&O's Summary    I & Os for current day (as of 25 May 2017 16:16)  =============================================  IN: 1318 ml / OUT: 0 ml / NET: 1318 ml      CAPILLARY BLOOD GLUCOSE  87 (25 May 2017 12:35)  105 (25 May 2017 07:30)  135 (24 May 2017 21:28)  109 (24 May 2017 17:02)      PHYSICAL EXAM:    Constitutional: NAD, awake and alert, well-developed  HEENT: PERR, EOMI, Normal Hearing, MMM  Neck: Soft and supple, No LAD, No JVD  Respiratory: Breath sounds are clear bilaterally, No wheezing, rales or rhonchi  Cardiovascular: S1 and S2, regular rate and rhythm  Gastrointestinal: Bowel Sounds present, sof  Extremities: No peripheral edema  Vascular: 2+ peripheral pulses  Neurological: A/O x 3, no focal deficits  Musculoskeletal: moves all extrem  Skin: No rashes    MEDICATIONS:  MEDICATIONS  (STANDING):  heparin  Injectable 5000Unit(s) SubCutaneous every 12 hours  sodium chloride 0.9%. 1000milliLiter(s) IV Continuous <Continuous>  docusate sodium 100milliGRAM(s) Oral three times a day  insulin lispro (HumaLOG) corrective regimen sliding scale  SubCutaneous three times a day before meals  insulin lispro (HumaLOG) corrective regimen sliding scale  SubCutaneous at bedtime  dextrose 5%. 1000milliLiter(s) IV Continuous <Continuous>  dextrose 50% Injectable 12.5Gram(s) IV Push once  dextrose 50% Injectable 25Gram(s) IV Push once  dextrose 50% Injectable 25Gram(s) IV Push once  aspirin 325milliGRAM(s) Oral daily  losartan 25milliGRAM(s) Oral daily  atorvastatin 10milliGRAM(s) Oral at bedtime  metoprolol succinate ER 50milliGRAM(s) Oral daily  polyethylene glycol 3350 17Gram(s) Oral daily  pantoprazole    Tablet 40milliGRAM(s) Oral before breakfast  multivitamin 1Tablet(s) Oral daily  aztreonam  IVPB 1000milliGRAM(s) IV Intermittent every 8 hours  aztreonam  IVPB  IV Intermittent       LABS: All Labs Reviewed:                        10.5   14.2  )-----------( 176      ( 25 May 2017 06:01 )             29.5     05-25    137  |  107  |  14  ----------------------------<  116<H>  3.8   |  18<L>  |  1.29    Ca    7.3<L>      25 May 2017 06:01    TPro  6.3  /  Alb  2.4<L>  /  TBili  1.1  /  DBili  x   /  AST  15  /  ALT  16  /  AlkPhos  65  05-25    PT/INR - ( 25 May 2017 13:44 )   PT: 19.8 sec;   INR: 1.81 ratio         PTT - ( 25 May 2017 13:44 )  PTT:39.8 sec      Blood Culture: 05-22 @ 13:41  Organism --  Gram Stain Blood -- Gram Stain --  Specimen Source .Blood None  Culture-Blood --    05-22 @ 13:38  Organism --  Gram Stain Blood -- Gram Stain --  Specimen Source .Blood None  Culture-Blood --    05-22 @ 02:04  Organism --  Gram Stain Blood -- Gram Stain --  Specimen Source .Urine None  Culture-Blood --      87F c/o abdominal pain.  left flank which wraps arround to right side beneath diaphragm.   onset 1 day ago.  no provocative or palliative factors.  no a/w diet. ED given MS + Zosyn + NS.  US (+) gallstone and sludge.  CT AB/pelvis (+) gallstone and sludge in gallbladder neck.  (+) inflammation.  lactate 2.6.      Pt was admitted to med surg for further evaluation. She originally declined surgery unless she spoke with her regular physicians. Dr Levine saw her and she became agreeable to surgery. Because patient was persistently febrile and BP started to trend down, she was felt to be a candidate for urgent surgery and cholecystectomy.           Assessment and Plan:   		  *Acute Cholangitis/cholecystitis with sepsis (fever, leukocytosis, hypotension)  -source evaluation with blood cultures pending. CTAP: Gallstones and/or sludge with gallstones suggested in the gallbladder neck. Predominantly pericholecystic inflammatory change, increased from prior study. HIDA abnormal  -Elevated lactate on arrival, improved with IVF  - ciprofloxacin + metronidazole D#3- change antibiotic to aztreonam  -defintive source control with cholecystectomy  -Julianna, surgery following.    preoperative cardiovascular assessment:  Pt denies prior cardiac history, complications with anesthesia. No red flag symptoms currently. Able to complete 4 mets, though reports mild SOB. RCRI with 0 points given pt's history. No indication for delay in urgent/emergent procedure. Pt is low cardiovascular risk for intermediate/high cardiovascular risk procedure (depending on laparoscopic vs open cholecystectomy). Pt is medically optimized to proceed with surgery.     Due to emergent procedure, unable to hold ARB day of surgery. Close perioperative follow up of glucose, Cr, BP. For completeness, CXR without pulmonary edema, small pleural effusion (likely due to IVF for 72 hours). Troponin negative x 3 prior to surgery.      *HTN.  -c/w losartan.    *hyperlipidemia.  -c/w statin.    *Type 2DM without complication  - a1c 6.1  - ADA as PTA  - correction insulin coverage PRn

## 2017-05-25 NOTE — PROVIDER CONTACT NOTE (CRITICAL VALUE NOTIFICATION) - ASSESSMENT
pt with continued low BP 83/39 after 500cc bolus given. resp 23-35 HR 88 temp 99.8. Pt awake A& O to peson,  and place. responsive, denies pain at this time. Abdomen soft. No family present in hospital at this time. Irish speaking

## 2017-05-25 NOTE — PHYSICAL THERAPY INITIAL EVALUATION ADULT - MODALITIES TREATMENT COMMENTS
Patient left out of bed in chair. RNAmy and CHARLES Wiseman NP in room.  Patient with  max, T 102.2 with O2 sats 91% on room air.

## 2017-05-25 NOTE — PROGRESS NOTE ADULT - ASSESSMENT
86 y/o Female with h/o DM type 2, HL, HTN was admitted on 5/22 for worsening abdominal pain. She has left flank pain which wraps around to right side beneath diaphragm. In ED she received Zosyn.     1. Acute gallstone cholecystitis. Allergy to PCN.  -febrile syndrome is persistent  -on cipro 400 mg IV q12h and metronidazole 500 mg IV q8h # 4  -tolerating abx well so far; no side effects noted   -for surgery  -f/u repeat BC x 2  -continue abx coverage  -monitor temps  -f/u CBC  -supportive care  2. Other issues: DM type 2, HL, HTN  -care per medicine

## 2017-05-25 NOTE — BRIEF OPERATIVE NOTE - OPERATION/FINDINGS
extensive adhesions to right upper quadrant region. Necrotic/gangrenous gallbladder removed in its entirety. hemostasis assured. Drain placed to hepatorenal space and liver bed region

## 2017-05-26 LAB
ALBUMIN SERPL ELPH-MCNC: 2.3 G/DL — LOW (ref 3.3–5)
ALP SERPL-CCNC: 79 U/L — SIGNIFICANT CHANGE UP (ref 40–120)
ALT FLD-CCNC: 26 U/L — SIGNIFICANT CHANGE UP (ref 12–78)
ANION GAP SERPL CALC-SCNC: 7 MMOL/L — SIGNIFICANT CHANGE UP (ref 5–17)
AST SERPL-CCNC: 67 U/L — HIGH (ref 15–37)
BILIRUB SERPL-MCNC: 1.1 MG/DL — SIGNIFICANT CHANGE UP (ref 0.2–1.2)
BUN SERPL-MCNC: 23 MG/DL — SIGNIFICANT CHANGE UP (ref 7–23)
CA-I BLD-SCNC: 0.88 MMOL/L — LOW (ref 1.05–1.34)
CALCIUM SERPL-MCNC: 6.4 MG/DL — CRITICAL LOW (ref 8.5–10.1)
CHLORIDE SERPL-SCNC: 109 MMOL/L — HIGH (ref 96–108)
CO2 SERPL-SCNC: 18 MMOL/L — LOW (ref 22–31)
CREAT SERPL-MCNC: 2.25 MG/DL — HIGH (ref 0.5–1.3)
GLUCOSE SERPL-MCNC: 174 MG/DL — HIGH (ref 70–99)
GRAM STN FLD: SIGNIFICANT CHANGE UP
HCT VFR BLD CALC: 32.6 % — LOW (ref 34.5–45)
HGB BLD-MCNC: 11.1 G/DL — LOW (ref 11.5–15.5)
LACTATE SERPL-SCNC: 2.8 MMOL/L — HIGH (ref 0.7–2)
LYMPHOCYTES # BLD AUTO: 4 % — LOW (ref 13–44)
MANUAL DIF COMMENT BLD-IMP: MANUAL DIFF — SIGNIFICANT CHANGE UP
MANUAL SMEAR VERIFICATION: SIGNIFICANT CHANGE UP
MCHC RBC-ENTMCNC: 32.4 PG — SIGNIFICANT CHANGE UP (ref 27–34)
MCHC RBC-ENTMCNC: 34 GM/DL — SIGNIFICANT CHANGE UP (ref 32–36)
MCV RBC AUTO: 95.1 FL — SIGNIFICANT CHANGE UP (ref 80–100)
MONOCYTES NFR BLD AUTO: 4 % — SIGNIFICANT CHANGE UP (ref 2–14)
NEUTROPHILS NFR BLD AUTO: 83 % — HIGH (ref 43–77)
NEUTS BAND # BLD: 9 % — HIGH (ref 0–8)
PLAT MORPH BLD: NORMAL — SIGNIFICANT CHANGE UP
PLATELET # BLD AUTO: 212 K/UL — SIGNIFICANT CHANGE UP (ref 150–400)
PLATELET COUNT - ESTIMATE: ADEQUATE — SIGNIFICANT CHANGE UP
POTASSIUM SERPL-MCNC: 4.3 MMOL/L — SIGNIFICANT CHANGE UP (ref 3.5–5.3)
POTASSIUM SERPL-SCNC: 4.3 MMOL/L — SIGNIFICANT CHANGE UP (ref 3.5–5.3)
PROT SERPL-MCNC: 6.3 GM/DL — SIGNIFICANT CHANGE UP (ref 6–8.3)
RBC # BLD: 3.42 M/UL — LOW (ref 3.8–5.2)
RBC # FLD: 15.4 % — HIGH (ref 10.3–14.5)
RBC BLD AUTO: SIGNIFICANT CHANGE UP
SODIUM SERPL-SCNC: 134 MMOL/L — LOW (ref 135–145)
SPECIMEN SOURCE: SIGNIFICANT CHANGE UP
SPECIMEN SOURCE: SIGNIFICANT CHANGE UP
WBC # BLD: 18.8 K/UL — HIGH (ref 3.8–10.5)
WBC # FLD AUTO: 18.8 K/UL — HIGH (ref 3.8–10.5)

## 2017-05-26 PROCEDURE — 71010: CPT | Mod: 26

## 2017-05-26 RX ORDER — PHENYLEPHRINE HYDROCHLORIDE 10 MG/ML
0.5 INJECTION INTRAVENOUS
Qty: 80 | Refills: 0 | Status: DISCONTINUED | OUTPATIENT
Start: 2017-05-26 | End: 2017-05-31

## 2017-05-26 RX ORDER — CALCIUM GLUCONATE 100 MG/ML
2 VIAL (ML) INTRAVENOUS ONCE
Qty: 0 | Refills: 0 | Status: COMPLETED | OUTPATIENT
Start: 2017-05-26 | End: 2017-05-26

## 2017-05-26 RX ORDER — SODIUM CHLORIDE 9 MG/ML
1000 INJECTION INTRAMUSCULAR; INTRAVENOUS; SUBCUTANEOUS
Qty: 0 | Refills: 0 | Status: DISCONTINUED | OUTPATIENT
Start: 2017-05-26 | End: 2017-05-26

## 2017-05-26 RX ORDER — MEROPENEM 1 G/30ML
INJECTION INTRAVENOUS
Qty: 0 | Refills: 0 | Status: DISCONTINUED | OUTPATIENT
Start: 2017-05-26 | End: 2017-05-28

## 2017-05-26 RX ORDER — SODIUM CHLORIDE 9 MG/ML
1000 INJECTION INTRAMUSCULAR; INTRAVENOUS; SUBCUTANEOUS ONCE
Qty: 0 | Refills: 0 | Status: COMPLETED | OUTPATIENT
Start: 2017-05-26 | End: 2017-05-26

## 2017-05-26 RX ORDER — MEROPENEM 1 G/30ML
500 INJECTION INTRAVENOUS ONCE
Qty: 0 | Refills: 0 | Status: COMPLETED | OUTPATIENT
Start: 2017-05-26 | End: 2017-05-26

## 2017-05-26 RX ORDER — MEROPENEM 1 G/30ML
500 INJECTION INTRAVENOUS EVERY 8 HOURS
Qty: 0 | Refills: 0 | Status: DISCONTINUED | OUTPATIENT
Start: 2017-05-26 | End: 2017-05-28

## 2017-05-26 RX ORDER — SODIUM CHLORIDE 9 MG/ML
1000 INJECTION INTRAMUSCULAR; INTRAVENOUS; SUBCUTANEOUS
Qty: 0 | Refills: 0 | Status: DISCONTINUED | OUTPATIENT
Start: 2017-05-26 | End: 2017-05-28

## 2017-05-26 RX ADMIN — Medication 200 GRAM(S): at 07:07

## 2017-05-26 RX ADMIN — SODIUM CHLORIDE 75 MILLILITER(S): 9 INJECTION INTRAMUSCULAR; INTRAVENOUS; SUBCUTANEOUS at 21:54

## 2017-05-26 RX ADMIN — PHENYLEPHRINE HYDROCHLORIDE 15.3 MICROGRAM(S)/KG/MIN: 10 INJECTION INTRAVENOUS at 00:26

## 2017-05-26 RX ADMIN — HEPARIN SODIUM 5000 UNIT(S): 5000 INJECTION INTRAVENOUS; SUBCUTANEOUS at 21:43

## 2017-05-26 RX ADMIN — Medication 100 MILLIGRAM(S): at 05:17

## 2017-05-26 RX ADMIN — SODIUM CHLORIDE 1000 MILLILITER(S): 9 INJECTION INTRAMUSCULAR; INTRAVENOUS; SUBCUTANEOUS at 13:30

## 2017-05-26 RX ADMIN — Medication 250 MILLIGRAM(S): at 01:41

## 2017-05-26 RX ADMIN — HEPARIN SODIUM 5000 UNIT(S): 5000 INJECTION INTRAVENOUS; SUBCUTANEOUS at 13:46

## 2017-05-26 RX ADMIN — Medication 1 TABLET(S): at 11:45

## 2017-05-26 RX ADMIN — MEROPENEM 200 MILLIGRAM(S): 1 INJECTION INTRAVENOUS at 18:06

## 2017-05-26 RX ADMIN — PHENYLEPHRINE HYDROCHLORIDE 15.3 MICROGRAM(S)/KG/MIN: 10 INJECTION INTRAVENOUS at 08:00

## 2017-05-26 RX ADMIN — Medication 100 MILLIGRAM(S): at 13:45

## 2017-05-26 RX ADMIN — POLYETHYLENE GLYCOL 3350 17 GRAM(S): 17 POWDER, FOR SOLUTION ORAL at 11:45

## 2017-05-26 RX ADMIN — PHENYLEPHRINE HYDROCHLORIDE 15.3 MICROGRAM(S)/KG/MIN: 10 INJECTION INTRAVENOUS at 13:30

## 2017-05-26 RX ADMIN — PANTOPRAZOLE SODIUM 40 MILLIGRAM(S): 20 TABLET, DELAYED RELEASE ORAL at 10:09

## 2017-05-26 RX ADMIN — Medication 650 MILLIGRAM(S): at 19:59

## 2017-05-26 RX ADMIN — Medication 50 MILLIGRAM(S): at 05:17

## 2017-05-26 RX ADMIN — MEROPENEM 200 MILLIGRAM(S): 1 INJECTION INTRAVENOUS at 21:43

## 2017-05-26 RX ADMIN — Medication 100 MILLIGRAM(S): at 15:07

## 2017-05-26 RX ADMIN — ATORVASTATIN CALCIUM 10 MILLIGRAM(S): 80 TABLET, FILM COATED ORAL at 21:43

## 2017-05-26 RX ADMIN — Medication 50 MILLIGRAM(S): at 13:46

## 2017-05-26 RX ADMIN — PHENYLEPHRINE HYDROCHLORIDE 15.3 MICROGRAM(S)/KG/MIN: 10 INJECTION INTRAVENOUS at 20:07

## 2017-05-26 RX ADMIN — Medication 100 MILLIGRAM(S): at 21:43

## 2017-05-26 RX ADMIN — Medication 100 MILLIGRAM(S): at 00:17

## 2017-05-26 RX ADMIN — Medication 650 MILLIGRAM(S): at 20:30

## 2017-05-26 RX ADMIN — SODIUM CHLORIDE 1000 MILLILITER(S): 9 INJECTION INTRAMUSCULAR; INTRAVENOUS; SUBCUTANEOUS at 07:05

## 2017-05-26 NOTE — PROGRESS NOTE ADULT - SUBJECTIVE AND OBJECTIVE BOX
Patient is a 87y old  Female who presents with a chief complaint of abdominal pain. (22 May 2017 10:30)    HPI:  86 y/o Female with h/o DM type 2, HL, HTN was admitted on  for worsening abdominal pain. She has left flank pain which wraps around to right side beneath diaphragm. In ED she received Zosyn.  US (+) gallstone and sludge.  CT AB/pelvis (+) gallstone and sludge in gallbladder neck.  (+) inflammation.  lactate 2.6.      s/p surgery  Fever is down, but the patient remains hypotensive  Has mild right abdominal pain  Drain in place    MEDICATIONS  (STANDING):  docusate sodium 100milliGRAM(s) Oral three times a day  insulin lispro (HumaLOG) corrective regimen sliding scale  SubCutaneous three times a day before meals  insulin lispro (HumaLOG) corrective regimen sliding scale  SubCutaneous at bedtime  dextrose 5%. 1000milliLiter(s) IV Continuous <Continuous>  dextrose 50% Injectable 12.5Gram(s) IV Push once  dextrose 50% Injectable 25Gram(s) IV Push once  dextrose 50% Injectable 25Gram(s) IV Push once  losartan 25milliGRAM(s) Oral daily  atorvastatin 10milliGRAM(s) Oral at bedtime  metoprolol succinate ER 50milliGRAM(s) Oral daily  polyethylene glycol 3350 17Gram(s) Oral daily  pantoprazole    Tablet 40milliGRAM(s) Oral before breakfast  multivitamin 1Tablet(s) Oral daily  aztreonam  IVPB 1000milliGRAM(s) IV Intermittent every 8 hours  aztreonam  IVPB  IV Intermittent   heparin  Injectable 5000Unit(s) SubCutaneous every 8 hours  metroNIDAZOLE  IVPB  IV Intermittent   metroNIDAZOLE  IVPB 500milliGRAM(s) IV Intermittent every 8 hours  phenylephrine    Infusion 0.5MICROgram(s)/kG/Min IV Continuous <Continuous>  sodium chloride 0.9%. 1000milliLiter(s) IV Continuous <Continuous>    MEDICATIONS  (PRN):  acetaminophen   Tablet 650milliGRAM(s) Oral every 6 hours PRN For Temp greater than 38.5 C (101.3 F)  acetaminophen   Tablet. 650milliGRAM(s) Oral every 6 hours PRN Mild Pain (1 - 3)  dextrose Gel 1Dose(s) Oral once PRN Blood Glucose LESS THAN 70 milliGRAM(s)/deciliter  glucagon  Injectable 1milliGRAM(s) IntraMuscular once PRN Glucose LESS THAN 70 milligrams/deciliter  HYDROmorphone  Injectable 1milliGRAM(s) IV Push every 4 hours PRN Severe Pain  oxyCODONE IR 5milliGRAM(s) Oral every 6 hours PRN Moderate Pain      Vital Signs Last 24 Hrs  T(C): 37.3, Max: 38 ( @ 19:33)  T(F): 99.2, Max: 100.4 ( @ 19:33)  HR: 82 (66 - 107)  BP: 97/67 (52/31 - 133/80)  BP(mean): 70 (35 - 94)  RR: 26 (19 - 31)  SpO2: 100% (96% - 100%)    Physical Exam:    Constitutional: frail looking  HEENT: NC/AT, EOMI, PERRLA  Neck: supple  Back: no tenderness  Respiratory: clear  Cardiovascular: S1S2 regular, no murmurs  Abdomen: soft, RUQ abdomen tender, not distended, positive BS; drain in place  Genitourinary: deferred  Rectal: deferred  Musculoskeletal: no muscle tenderness, no joint swelling or tenderness  Extremities: no pedal edema  Neurological: AxOx3, moving all extremities, no focal deficits  Skin: no rashes    Labs:                        11.1   18.8  )-----------( 212      ( 26 May 2017 04:48 )             32.6         134<L>  |  109<H>  |  23  ----------------------------<  174<H>  4.3   |  18<L>  |  2.25<H>    Ca    6.4<LL>      26 May 2017 04:48    TPro  6.3  /  Alb  2.3<L>  /  TBili  1.1  /  DBili  x   /  AST  67<H>  /  ALT  26  /  AlkPhos  79             Cultures:              11.5   15.2  )-----------( 204      ( 24 May 2017 05:55 )             33.5         138  |  107  |  10  ----------------------------<  141<H>  4.2   |  22  |  1.06    Ca    7.8<L>      24 May 2017 05:55    TPro  6.8  /  Alb  2.8<L>  /  TBili  1.6<H>  /  DBili  x   /  AST  24  /  ALT  22  /  AlkPhos  86  0524           Cultures:              10.6   5.6   )-----------( 209      ( 23 May 2017 06:23 )             33.4     05-23    143  |  111<H>  |  11  ----------------------------<  129<H>  5.0   |  23  |  0.99    Ca    8.1<L>      23 May 2017 06:23  Mg     1.7         TPro  6.8  /  Alb  2.8<L>  /  TBili  1.1  /  DBili  0.2  /  AST  18  /  ALT  22  /  AlkPhos  83                 12.3   7.7   )-----------( 272      ( 21 May 2017 22:34 )             38.0     05-21    138  |  108  |  20  ----------------------------<  143<H>  5.2   |  24  |  1.34<H>    Ca    8.3<L>      21 May 2017 22:34  Mg     1.7         TPro  8.2  /  Alb  3.5  /  TBili  0.6  /  DBili  x   /  AST  26  /  ALT  26  /  AlkPhos  117  05     LIVER FUNCTIONS - ( 21 May 2017 22:34 )  Alb: 3.5 g/dL / Pro: 8.2 gm/dL / ALK PHOS: 117 U/L / ALT: 26 U/L / AST: 26 U/L / GGT: x           Urinalysis Basic - ( 22 May 2017 02:04 )    Color: Yellow / Appearance: Clear / S.010 / pH: x  Gluc: x / Ketone: Negative  / Bili: Negative / Urobili: Negative mg/dL   Blood: x / Protein: Negative mg/dL / Nitrite: Negative   Leuk Esterase: Negative / RBC: 3-5 /HPF / WBC 0-2   Sq Epi: x / Non Sq Epi: Negative / Bacteria: Occasional      Radiology:    CT abdomen and pelvis:   Gallstones and/or sludge with gallstones suggested in the gallbladder   neck. Predominantly pericholecystic inflammatory change, increased from   prior study. Findings could represent acute cholecystitis. Consider   further evaluation with nuclear medicine DISIDA scan which is more   sensitive for the detection of such diagnosis.    HIDA:  Abnormal morphine-augmented hepatobiliary scan.  Findings consistent with acute cholecystitis.    Advanced directives addressed: full resuscitation

## 2017-05-26 NOTE — PROGRESS NOTE ADULT - ASSESSMENT
Sepsis, s/p cholecystectomy- hypotensive.  Management pre primary team.  Close monitoring of the volume status is recommended .  Check 2 D echo.    HTn- hold BP meds    Hyperlipidemia- hold meds, pt NPO        Thank you for allowing me to participate in the care of this patient. Please feel free to contact me with any questions.

## 2017-05-26 NOTE — PROGRESS NOTE ADULT - SUBJECTIVE AND OBJECTIVE BOX
Interval History:S/P Ronda    MEDICATIONS  (STANDING):  docusate sodium 100milliGRAM(s) Oral three times a day  insulin lispro (HumaLOG) corrective regimen sliding scale  SubCutaneous three times a day before meals  insulin lispro (HumaLOG) corrective regimen sliding scale  SubCutaneous at bedtime  dextrose 5%. 1000milliLiter(s) IV Continuous <Continuous>  dextrose 50% Injectable 12.5Gram(s) IV Push once  dextrose 50% Injectable 25Gram(s) IV Push once  dextrose 50% Injectable 25Gram(s) IV Push once  losartan 25milliGRAM(s) Oral daily  atorvastatin 10milliGRAM(s) Oral at bedtime  metoprolol succinate ER 50milliGRAM(s) Oral daily  polyethylene glycol 3350 17Gram(s) Oral daily  pantoprazole    Tablet 40milliGRAM(s) Oral before breakfast  multivitamin 1Tablet(s) Oral daily  heparin  Injectable 5000Unit(s) SubCutaneous every 8 hours  phenylephrine    Infusion 0.5MICROgram(s)/kG/Min IV Continuous <Continuous>  sodium chloride 0.9%. 1000milliLiter(s) IV Continuous <Continuous>  meropenem IVPB  IV Intermittent     MEDICATIONS  (PRN):  acetaminophen   Tablet 650milliGRAM(s) Oral every 6 hours PRN For Temp greater than 38.5 C (101.3 F)  acetaminophen   Tablet. 650milliGRAM(s) Oral every 6 hours PRN Mild Pain (1 - 3)  dextrose Gel 1Dose(s) Oral once PRN Blood Glucose LESS THAN 70 milliGRAM(s)/deciliter  glucagon  Injectable 1milliGRAM(s) IntraMuscular once PRN Glucose LESS THAN 70 milligrams/deciliter  HYDROmorphone  Injectable 1milliGRAM(s) IV Push every 4 hours PRN Severe Pain  oxyCODONE IR 5milliGRAM(s) Oral every 6 hours PRN Moderate Pain      Daily     Daily   BMI: 35.1 (05-21 @ 21:49)  Change in Weight:  Vital Signs Last 24 Hrs  T(C): 37.3, Max: 38 (05-25 @ 19:33)  T(F): 99.2, Max: 100.4 (05-25 @ 19:33)  HR: 101 (66 - 112)  BP: 125/44 (52/31 - 146/92)  BP(mean): 65 (35 - 101)  RR: 23 (19 - 31)  SpO2: 100% (96% - 100%)  I&O's Detail  I & Os for 24h ending 26 May 2017 07:00  =============================================  IN:    Other: 1500 ml    Sodium Chloride 0.9% IV Bolus: 1000 ml    Packed Red Blood Cells: 619 ml    IV PiggyBack: 400 ml    phenylephrine   Infusion: 378 ml    sodium chloride 0.9%: 75 ml    Total IN: 3972 ml  ---------------------------------------------  OUT:    Bulb: 145 ml    Ureteral Catheter: 50 ml    Total OUT: 195 ml  ---------------------------------------------  Total NET: 3777 ml    I & Os for current day (as of 26 May 2017 17:13)  =============================================  IN:    Sodium Chloride 0.9% IV Bolus: 1000 ml    sodium chloride 0.9%: 450 ml    sodium chloride 0.9%: 250 ml    IV PiggyBack: 150 ml    sodium chloride 0.9%.: 150 ml    phenylephrine   Infusion: 98 ml    Total IN: 2098 ml  ---------------------------------------------  OUT:    Bulb: 140 ml    Ureteral Catheter: 90 ml    Total OUT: 230 ml  ---------------------------------------------  Total NET: 1868 ml      PHYSICAL EXAM  General:  Well developed, well nourished, alert and active, no pallor, NAD.  HEENT:    Normal appearance of conjunctiva, ears, nose, lips, oropharynx, and oral mucosa, anicteric.  Neck:  No masses, no asymmetry.  Lymph Nodes:  No lymphadenopathy.   Cardiovascular:  RRR normal S1/S2, no murmur.  Respiratory:  CTA B/L, normal respiratory effort.   Abdominal:   soft, no masses or tenderness, normoactive BS, NT/ND, no HSM.  Extremities:   No clubbing or cyanosis, normal capillary refill, no edema.   Skin:   No rash, jaundice, lesions, eczema.   Musculoskeletal:  No joint swelling, erythema or tenderness.   Other:     Lab Results:                        11.1   18.8  )-----------( 212      ( 26 May 2017 04:48 )             32.6     05-26    134<L>  |  109<H>  |  23  ----------------------------<  174<H>  4.3   |  18<L>  |  2.25<H>    Ca    6.4<LL>      26 May 2017 04:48    TPro  6.3  /  Alb  2.3<L>  /  TBili  1.1  /  DBili  x   /  AST  67<H>  /  ALT  26  /  AlkPhos  79  05-26    LIVER FUNCTIONS - ( 26 May 2017 04:48 )  Alb: 2.3 g/dL / Pro: 6.3 gm/dL / ALK PHOS: 79 U/L / ALT: 26 U/L / AST: 67 U/L / GGT: x           PT/INR - ( 25 May 2017 13:44 )   PT: 19.8 sec;   INR: 1.81 ratio         PTT - ( 25 May 2017 13:44 )  PTT:39.8 sec      Stool Results:          RADIOLOGY RESULTS:    SURGICAL PATHOLOGY:

## 2017-05-26 NOTE — PROGRESS NOTE ADULT - ASSESSMENT
A/P:  S/P open cholecystectomy for gangrenous cholecystitis  Critical care management per Intensivist  GI/DVT prophylaxis  Pain control  Incentive spirometry  Serial abd exams  Drain care  F/U labs  Cont current care and meds  ID on consult

## 2017-05-26 NOTE — PROGRESS NOTE ADULT - SUBJECTIVE AND OBJECTIVE BOX
CC:Patient is a 87y old  Female who presents with a chief complaint of abdominal pain. (22 May 2017 10:30)      Subjective:  Pt seen and examined at bedside with chaperone. Pt is AAOx3. Pt on pressor support. Pt states abd incisional pain. Pt denied c/o fever, chills, chest pain, SOB, N/V/D, extremity pain or dysfunction, hemoptysis, hematemesis, hematuria, hematochexia, headache, diplopia, vertigo, dizzyness. Pt tolerating diet, (+) inman    Pt s/p 2 unit PRBC transfusion 5/25/17    ROS:  abd pain, otherwise negative ROS    Vital Signs Last 24 Hrs  T(C): 37.3, Max: 38 (05-25 @ 19:33)  T(F): 99.2, Max: 100.4 (05-25 @ 19:33)  HR: 82 (66 - 107)  BP: 97/67 (52/31 - 133/80)  BP(mean): 70 (35 - 94)  RR: 26 (18 - 31)  SpO2: 100% (96% - 100%)    Labs:      CARDIAC MARKERS ( 25 May 2017 21:16 )  <0.015 ng/mL / x     / 150 U/L / x     / x                                11.1   18.8  )-----------( 212      ( 26 May 2017 04:48 )             32.6     CBC Full  -  ( 26 May 2017 04:48 )  WBC Count : 18.8 K/uL  Hemoglobin : 11.1 g/dL  Hematocrit : 32.6 %  Platelet Count - Automated : 212 K/uL  Mean Cell Volume : 95.1 fl  Mean Cell Hemoglobin : 32.4 pg  Mean Cell Hemoglobin Concentration : 34.0 gm/dL  Auto Neutrophil # : x  Auto Lymphocyte # : x  Auto Monocyte # : x  Auto Eosinophil # : x  Auto Basophil # : x  Auto Neutrophil % : 83.0 %  Auto Lymphocyte % : 4.0 %  Auto Monocyte % : 4.0 %  Auto Eosinophil % : x  Auto Basophil % : x    05-26    134<L>  |  109<H>  |  23  ----------------------------<  174<H>  4.3   |  18<L>  |  2.25<H>    Ca    6.4<LL>      26 May 2017 04:48    TPro  6.3  /  Alb  2.3<L>  /  TBili  1.1  /  DBili  x   /  AST  67<H>  /  ALT  26  /  AlkPhos  79  05-26    LIVER FUNCTIONS - ( 26 May 2017 04:48 )  Alb: 2.3 g/dL / Pro: 6.3 gm/dL / ALK PHOS: 79 U/L / ALT: 26 U/L / AST: 67 U/L / GGT: x           PT/INR - ( 25 May 2017 13:44 )   PT: 19.8 sec;   INR: 1.81 ratio         PTT - ( 25 May 2017 13:44 )  PTT:39.8 sec      Meds:  acetaminophen   Tablet 650milliGRAM(s) Oral every 6 hours PRN  acetaminophen   Tablet. 650milliGRAM(s) Oral every 6 hours PRN  docusate sodium 100milliGRAM(s) Oral three times a day  insulin lispro (HumaLOG) corrective regimen sliding scale  SubCutaneous three times a day before meals  insulin lispro (HumaLOG) corrective regimen sliding scale  SubCutaneous at bedtime  dextrose 5%. 1000milliLiter(s) IV Continuous <Continuous>  dextrose Gel 1Dose(s) Oral once PRN  dextrose 50% Injectable 12.5Gram(s) IV Push once  dextrose 50% Injectable 25Gram(s) IV Push once  dextrose 50% Injectable 25Gram(s) IV Push once  glucagon  Injectable 1milliGRAM(s) IntraMuscular once PRN  losartan 25milliGRAM(s) Oral daily  atorvastatin 10milliGRAM(s) Oral at bedtime  metoprolol succinate ER 50milliGRAM(s) Oral daily  polyethylene glycol 3350 17Gram(s) Oral daily  pantoprazole    Tablet 40milliGRAM(s) Oral before breakfast  multivitamin 1Tablet(s) Oral daily  aztreonam  IVPB 1000milliGRAM(s) IV Intermittent every 8 hours  aztreonam  IVPB  IV Intermittent   heparin  Injectable 5000Unit(s) SubCutaneous every 8 hours  HYDROmorphone  Injectable 1milliGRAM(s) IV Push every 4 hours PRN  oxyCODONE IR 5milliGRAM(s) Oral every 6 hours PRN  metroNIDAZOLE  IVPB  IV Intermittent   metroNIDAZOLE  IVPB 500milliGRAM(s) IV Intermittent every 8 hours  phenylephrine    Infusion 0.5MICROgram(s)/kG/Min IV Continuous <Continuous>  sodium chloride 0.9%. 1000milliLiter(s) IV Continuous <Continuous>      Radiology:  EXAM:  CHEST SINGLE VIEW FRONTAL                            PROCEDURE DATE:  05/25/2017        INTERPRETATION:  Exam Date: 5/25/2017 11:57 PM    Chest radiograph (one view)         CLINICAL INFORMATION:  sob    TECHNIQUE:  Single frontal view of the chest was obtained.    COMPARISON: May 25, 2017    FINDINGS/  IMPRESSION:          Evaluation the lungs is limited secondary to patient body habitus and   suboptimal inspiration. Prominence of interstitial markings in the   bilateral lungs is nonspecific, unchanged since prior exams, may reflect   chronic versus acute etiology. Cardiomediastinal silhouette is intact.                ANAYA FINNEGAN M.D., ATTENDING RADIOLOGIST  This document has been electronically signed. May 26 2017 12:03PM        Physical exam:  Pt in no acute distress  Resp: (+) rhonchi b/l  CVS: S1S2(+)  ABD: bowel sounds (+), soft, non distended, no rebound, no guarding, no rigidity, no skin changes to exam. Incision site dressing are clean, dry, intact, no cellulitis, no d/c, no purulence, no fluctuance. (+) appropriate incisional tenderness to exam. Drain demonstrates appropriate serosanguinous output  EXT: no calf tenderness or edema to exam b/l, on VTE prophylaxis  Skin: no skin changes to exam, no jaundice or icteric sclera b/l

## 2017-05-26 NOTE — PROGRESS NOTE ADULT - ASSESSMENT
87y old F:  Septic Shock - On pressors  s/p Open Ronda    Plan:  Cont ICU Care  Critically Ill  Septic Shock - On pressors  Keep MAP > 65  NPO   Agressive IVF  Monitor renal function - worsening  Strict I/O's  IV Abx per ID  DVT prophylaxis - Hep SQ      PULM: Continue NCO2    Cardio: INÉS started for BP support.  Patient is likely in hypovolemic shock as she has had a decreased H & H.      Renal: Poor UO.  Continue IVF after the second U PRBC.  pre Op she had a rising Cr.  [place] fo I & O in a post op hypotensive patient    ID: Will continue Aztreonam.  Added Flagyl for anaerobic coverage andd 1 dose Vanco.  ID to see in AM    Surg: ROBERT with 80 cc since arrival today-60 when she just came into the ICU and then 20 after>  Called Surgery to make aware.      Will senc Chem 7 and CBC post transfusion    Mina spent 45 min

## 2017-05-26 NOTE — PROVIDER CONTACT NOTE (CRITICAL VALUE NOTIFICATION) - SITUATION
Pt with low BP 80-88/39
MD ordered Ca replacement
pt already being followed by ID and is on antibiotics

## 2017-05-26 NOTE — PROGRESS NOTE ADULT - SUBJECTIVE AND OBJECTIVE BOX
CC: SOB    HPI:  87F.  c/o abdominal pain.  left flank which wraps arround to right side beneath diaphragm.   onset 1 day ago.  no provocative or palliative factors.  no a/w diet.  ED given MS + Zosyn + NS.  US (+) gallstone and sludge.  CT AB/pelvis (+) gallstone and sludge in gallbladder neck.  (+) inflammation.  lactate 2.6.      5/26 - Patient seen and examined. Events noted. Remains hypotensive.    PMHx:  type 2DM;  HTN;  hyperlipidemia.    PSHx:  hip sx.    ALL:  PCN-swelling of the lips.    Rx:  reviewed.    SocHx:  lives w/ son in the community.  able to ambulate w/ cane.  no tobacco.  no EtOH.    FHx:  NC. (22 May 2017 10:30)      PAST MEDICAL & SURGICAL HISTORY:  Hyperlipidemia  Diabetes: DM II  Hypertension      FAMILY HISTORY:      Social Hx:    Allergies    penicillins (Unknown)    Intolerances        87y        ICU Vital Signs Last 24 Hrs  T(C): 37.2, Max: 39.2 (05-25 @ 12:34)  T(F): 98.9, Max: 102.5 (05-25 @ 12:34)  HR: 82 (66 - 116)  BP: 84/48 (52/31 - 113/17)  BP(mean): 58 (35 - 90)  ABP: --  ABP(mean): --  RR: 20 (17 - 31)  SpO2: 100% (95% - 100%)          I&O's Summary  I & Os for 24h ending 26 May 2017 07:00  =============================================  IN: 3972 ml / OUT: 195 ml / NET: 3777 ml    I & Os for current day (as of 26 May 2017 11:08)  =============================================  IN: 323 ml / OUT: 110 ml / NET: 213 ml                            11.1   18.8  )-----------( 212      ( 26 May 2017 04:48 )             32.6       05-26    134<L>  |  109<H>  |  23  ----------------------------<  174<H>  4.3   |  18<L>  |  2.25<H>    Ca    6.4<LL>      26 May 2017 04:48    TPro  6.3  /  Alb  2.3<L>  /  TBili  1.1  /  DBili  x   /  AST  67<H>  /  ALT  26  /  AlkPhos  79  05-26      CAPILLARY BLOOD GLUCOSE  112 (25 May 2017 21:00)  87 (25 May 2017 12:35)      LIVER FUNCTIONS - ( 26 May 2017 04:48 )  Alb: 2.3 g/dL / Pro: 6.3 gm/dL / ALK PHOS: 79 U/L / ALT: 26 U/L / AST: 67 U/L / GGT: x             CARDIAC MARKERS ( 25 May 2017 21:16 )  <0.015 ng/mL / x     / 150 U/L / x     / x            PT/INR - ( 25 May 2017 13:44 )   PT: 19.8 sec;   INR: 1.81 ratio         PTT - ( 25 May 2017 13:44 )  PTT:39.8 sec            MEDICATIONS  (STANDING):  docusate sodium 100milliGRAM(s) Oral three times a day  insulin lispro (HumaLOG) corrective regimen sliding scale  SubCutaneous three times a day before meals  insulin lispro (HumaLOG) corrective regimen sliding scale  SubCutaneous at bedtime  dextrose 5%. 1000milliLiter(s) IV Continuous <Continuous>  dextrose 50% Injectable 12.5Gram(s) IV Push once  dextrose 50% Injectable 25Gram(s) IV Push once  dextrose 50% Injectable 25Gram(s) IV Push once  losartan 25milliGRAM(s) Oral daily  atorvastatin 10milliGRAM(s) Oral at bedtime  metoprolol succinate ER 50milliGRAM(s) Oral daily  polyethylene glycol 3350 17Gram(s) Oral daily  pantoprazole    Tablet 40milliGRAM(s) Oral before breakfast  multivitamin 1Tablet(s) Oral daily  aztreonam  IVPB 1000milliGRAM(s) IV Intermittent every 8 hours  aztreonam  IVPB  IV Intermittent   heparin  Injectable 5000Unit(s) SubCutaneous every 8 hours  sodium chloride 0.9%. 1000milliLiter(s) IV Continuous <Continuous>  metroNIDAZOLE  IVPB  IV Intermittent   metroNIDAZOLE  IVPB 500milliGRAM(s) IV Intermittent every 8 hours  phenylephrine    Infusion 0.5MICROgram(s)/kG/Min IV Continuous <Continuous>    MEDICATIONS  (PRN):  acetaminophen   Tablet 650milliGRAM(s) Oral every 6 hours PRN For Temp greater than 38.5 C (101.3 F)  acetaminophen   Tablet. 650milliGRAM(s) Oral every 6 hours PRN Mild Pain (1 - 3)  dextrose Gel 1Dose(s) Oral once PRN Blood Glucose LESS THAN 70 milliGRAM(s)/deciliter  glucagon  Injectable 1milliGRAM(s) IntraMuscular once PRN Glucose LESS THAN 70 milligrams/deciliter  HYDROmorphone  Injectable 1milliGRAM(s) IV Push every 4 hours PRN Severe Pain  oxyCODONE IR 5milliGRAM(s) Oral every 6 hours PRN Moderate Pain          DVT Prophylaxis: Hep SQ    Advanced Directives:  Discussed with:    Visit Information:    45-min CC Time is exclusive of billed procedures and/or teaching and/or routine family updates.

## 2017-05-26 NOTE — PROGRESS NOTE ADULT - ASSESSMENT
86 y/o Female with h/o DM type 2, HL, HTN was admitted on 5/22 for worsening abdominal pain. She has left flank pain which wraps around to right side beneath diaphragm. In ED she received Zosyn.     1. Hypotension. Probable sepsis. Acute gallstone cholecystitis s/p surgery. Allergy to PCN.  -febrile syndrome is persistent  -s/p cipro 400 mg IV q12h   -given aztreonam  -on metronidazole 500 mg IV q8h # 5  -tolerating abx well so far; no side effects noted   -change abx to meropenem 500 mg IV q8h in [place] of persistent hypotension  -monitor closely due to PCN allergy history  -for surgery  -f/u repeat BC x 2  -continue abx coverage  -monitor temps  -f/u CBC  -supportive care  2. Other issues: DM type 2, HL, HTN  -care per medicine

## 2017-05-26 NOTE — PROGRESS NOTE ADULT - SUBJECTIVE AND OBJECTIVE BOX
Patient is a 87y old  Female who presents with a chief complaint of abdominal pain.     HPI:  87F.  c/o abdominal pain.  left flank which wraps arround to right side beneath diaphragm. Pt was noted to be septic and clinically deteriorating and was planned for emergent cholecystectomy by surgical team.  Cardiology initially  consulted for evaluation prior to anticipated emergent surgery.  Pt underwent emergent open cheolcystectomy yesterday and postop was hypotensive in the setting of sepsis and was transferred to ICU.    Pt this denies any CP or SOB but c/o diffuse abdominal discomfort in [place].      PMHx:  type 2DM;  HTN;  hyperlipidemia.    PSHx:  hip sx.    ALL:  PCN-swelling of the lips.    Rx:  reviewed.    SocHx:  lives w/ son in the community.  able to ambulate w/ cane.  no tobacco.  no EtOH.    FHx:  NC. (22 May 2017 10:30)      PAST MEDICAL & SURGICAL HISTORY:  Hyperlipidemia  Diabetes: DM II  Hypertension      MEDICATIONS  (STANDING):  heparin  Injectable 5000Unit(s) SubCutaneous every 12 hours  sodium chloride 0.9%. 1000milliLiter(s) IV Continuous <Continuous>  docusate sodium 100milliGRAM(s) Oral three times a day  insulin lispro (HumaLOG) corrective regimen sliding scale  SubCutaneous three times a day before meals  insulin lispro (HumaLOG) corrective regimen sliding scale  SubCutaneous at bedtime  dextrose 5%. 1000milliLiter(s) IV Continuous <Continuous>  dextrose 50% Injectable 12.5Gram(s) IV Push once  dextrose 50% Injectable 25Gram(s) IV Push once  dextrose 50% Injectable 25Gram(s) IV Push once  aspirin 325milliGRAM(s) Oral daily  losartan 25milliGRAM(s) Oral daily  atorvastatin 10milliGRAM(s) Oral at bedtime  metoprolol succinate ER 50milliGRAM(s) Oral daily  polyethylene glycol 3350 17Gram(s) Oral daily  pantoprazole    Tablet 40milliGRAM(s) Oral before breakfast  multivitamin 1Tablet(s) Oral daily  aztreonam  IVPB 1000milliGRAM(s) IV Intermittent every 8 hours  aztreonam  IVPB  IV Intermittent     MEDICATIONS  (PRN):  acetaminophen   Tablet 650milliGRAM(s) Oral every 6 hours PRN For Temp greater than 38.5 C (101.3 F)  acetaminophen   Tablet. 650milliGRAM(s) Oral every 6 hours PRN Mild Pain (1 - 3)  dextrose Gel 1Dose(s) Oral once PRN Blood Glucose LESS THAN 70 milliGRAM(s)/deciliter  glucagon  Injectable 1milliGRAM(s) IntraMuscular once PRN Glucose LESS THAN 70 milligrams/deciliter      FAMILY HISTORY:      SOCIAL HISTORY:  non smoker, no alcohol use      REVIEW OF SYSTEMS:  CONSTITUTIONAL:  No night sweats.  No fatigue, malaise, lethargy.  No fever or chills.  HEENT:  Eyes:  No visual changes.  No eye pain.      ENT:  No runny nose.  No epistaxis.  No sinus pain.  No sore throat.  No odynophagia.  No ear pain.  No congestion.  RESPIRATORY:  No cough.  No wheeze.  No hemoptysis.  No shortness of breath.  CARDIOVASCULAR:  No chest pains.  No palpitations. No shortness of breath, No orthopnea or PND.  GASTROINTESTINAL:  No abdominal pain.  No nausea or vomiting.  No diarrhea or constipation.  No hematemesis.  No hematochezia.  No melena.  GENITOURINARY:  No urgency.  No frequency.  No dysuria.  No hematuria.  No obstructive symptoms.  No discharge.  No pain.  No significant abnormal bleeding.  MUSCULOSKELETAL:  No musculoskeletal pain.  No joint swelling.  No arthritis.  NEUROLOGICAL:  No tingling or numbness or weakness.  PSYCHIATRIC:  No confusion  SKIN:  No rashes.  No lesions.  No wounds.  ENDOCRINE:  No unexplained weight loss.  No polydipsia.  No polyuria.  No polyphagia.  HEMATOLOGIC:  No anemia.  No purpura.  No petechiae.  No prolonged or excessive bleeding.   ALLERGIC AND IMMUNOLOGIC:  No pruritus.  No swelling.         Vital Signs Last 24 Hrs  T(C): 34.7, Max: 39.5 (05-25 @ 05:10)  T(F): 94.4, Max: 103.1 (05-25 @ 05:10)  HR: 116 (84 - 116)  BP: 98/50 (98/50 - 122/39)  BP(mean): --  RR: 17 (16 - 18)  SpO2: 95% (93% - 100%)    PHYSICAL EXAM-    Constitutional: obese ill looking female    Head: Head is normocephalic and atraumatic.      Neck: The patient's neck is supple without enlargement, has no palpable thyromegaly nor thyroid nodules and has no jugular venous distention. No audible carotid bruits. There are strong carotid pulses bilaterally. No JVD.     Cardiovascular: Regular rate and rhythm without S3, S4. No murmurs or rubs are appreciated.      Respiratory: Breath sounds are normal. No rales. No wheezing.    Abdomen:diffuse tender, nondistended with positive bowel sounds.      Extremity: trace  pitting edema b/l     Neurologic: The patient is alert and oriented.      Skin: No rash, no obvious lesions noted.      Psychiatric: The patient appears to be emotionally stable.      INTERPRETATION OF TELEMETRY:    ECG: sinus rythm with T wave inversion in V3 and flattening in V2.    I&O's Detail    I & Os for current day (as of 25 May 2017 16:24)  =============================================  IN:    IV PiggyBack: 500 ml    Oral Fluid: 460 ml    sodium chloride 0.9%.: 358 ml    Total IN: 1318 ml  ---------------------------------------------  OUT:    Total OUT: 0 ml  ---------------------------------------------  Total NET: 1318 ml      LABS:                        10.5   14.2  )-----------( 176      ( 25 May 2017 06:01 )             29.5     05-25    137  |  107  |  14  ----------------------------<  116<H>  3.8   |  18<L>  |  1.29    Ca    7.3<L>      25 May 2017 06:01    TPro  6.3  /  Alb  2.4<L>  /  TBili  1.1  /  DBili  x   /  AST  15  /  ALT  16  /  AlkPhos  65  05-25        PT/INR - ( 25 May 2017 13:44 )   PT: 19.8 sec;   INR: 1.81 ratio         PTT - ( 25 May 2017 13:44 )  PTT:39.8 sec    I&O's Summary    I & Os for current day (as of 25 May 2017 16:24)  =============================================  IN: 1318 ml / OUT: 0 ml / NET: 1318 ml    BNP  RADIOLOGY & ADDITIONAL STUDIES:

## 2017-05-27 LAB
ANION GAP SERPL CALC-SCNC: 14 MMOL/L — SIGNIFICANT CHANGE UP (ref 5–17)
BUN SERPL-MCNC: 28 MG/DL — HIGH (ref 7–23)
CALCIUM SERPL-MCNC: 6.7 MG/DL — LOW (ref 8.5–10.1)
CHLORIDE SERPL-SCNC: 116 MMOL/L — HIGH (ref 96–108)
CO2 SERPL-SCNC: 10 MMOL/L — CRITICAL LOW (ref 22–31)
CREAT SERPL-MCNC: 1.86 MG/DL — HIGH (ref 0.5–1.3)
CULTURE RESULTS: SIGNIFICANT CHANGE UP
CULTURE RESULTS: SIGNIFICANT CHANGE UP
GLUCOSE SERPL-MCNC: 90 MG/DL — SIGNIFICANT CHANGE UP (ref 70–99)
HCT VFR BLD CALC: 31.8 % — LOW (ref 34.5–45)
HGB BLD-MCNC: 10.5 G/DL — LOW (ref 11.5–15.5)
LACTATE SERPL-SCNC: 1.4 MMOL/L — SIGNIFICANT CHANGE UP (ref 0.7–2)
MCHC RBC-ENTMCNC: 31.9 PG — SIGNIFICANT CHANGE UP (ref 27–34)
MCHC RBC-ENTMCNC: 33.1 GM/DL — SIGNIFICANT CHANGE UP (ref 32–36)
MCV RBC AUTO: 96.3 FL — SIGNIFICANT CHANGE UP (ref 80–100)
PLATELET # BLD AUTO: 167 K/UL — SIGNIFICANT CHANGE UP (ref 150–400)
POTASSIUM SERPL-MCNC: 4 MMOL/L — SIGNIFICANT CHANGE UP (ref 3.5–5.3)
POTASSIUM SERPL-SCNC: 4 MMOL/L — SIGNIFICANT CHANGE UP (ref 3.5–5.3)
RBC # BLD: 3.3 M/UL — LOW (ref 3.8–5.2)
RBC # FLD: 16.5 % — HIGH (ref 10.3–14.5)
SODIUM SERPL-SCNC: 140 MMOL/L — SIGNIFICANT CHANGE UP (ref 135–145)
SPECIMEN SOURCE: SIGNIFICANT CHANGE UP
SPECIMEN SOURCE: SIGNIFICANT CHANGE UP
WBC # BLD: 12.3 K/UL — HIGH (ref 3.8–10.5)
WBC # FLD AUTO: 12.3 K/UL — HIGH (ref 3.8–10.5)

## 2017-05-27 RX ORDER — ACETAMINOPHEN 500 MG
1000 TABLET ORAL ONCE
Qty: 0 | Refills: 0 | Status: DISCONTINUED | OUTPATIENT
Start: 2017-05-27 | End: 2017-05-27

## 2017-05-27 RX ORDER — SODIUM CHLORIDE 9 MG/ML
500 INJECTION INTRAMUSCULAR; INTRAVENOUS; SUBCUTANEOUS ONCE
Qty: 0 | Refills: 0 | Status: COMPLETED | OUTPATIENT
Start: 2017-05-27 | End: 2017-05-27

## 2017-05-27 RX ORDER — IPRATROPIUM/ALBUTEROL SULFATE 18-103MCG
3 AEROSOL WITH ADAPTER (GRAM) INHALATION
Qty: 0 | Refills: 0 | Status: DISCONTINUED | OUTPATIENT
Start: 2017-05-27 | End: 2017-06-01

## 2017-05-27 RX ADMIN — PANTOPRAZOLE SODIUM 40 MILLIGRAM(S): 20 TABLET, DELAYED RELEASE ORAL at 10:26

## 2017-05-27 RX ADMIN — HEPARIN SODIUM 5000 UNIT(S): 5000 INJECTION INTRAVENOUS; SUBCUTANEOUS at 13:29

## 2017-05-27 RX ADMIN — PHENYLEPHRINE HYDROCHLORIDE 15.3 MICROGRAM(S)/KG/MIN: 10 INJECTION INTRAVENOUS at 11:07

## 2017-05-27 RX ADMIN — Medication 650 MILLIGRAM(S): at 23:06

## 2017-05-27 RX ADMIN — Medication 650 MILLIGRAM(S): at 02:15

## 2017-05-27 RX ADMIN — SODIUM CHLORIDE 1000 MILLILITER(S): 9 INJECTION INTRAMUSCULAR; INTRAVENOUS; SUBCUTANEOUS at 14:20

## 2017-05-27 RX ADMIN — POLYETHYLENE GLYCOL 3350 17 GRAM(S): 17 POWDER, FOR SOLUTION ORAL at 12:52

## 2017-05-27 RX ADMIN — Medication 650 MILLIGRAM(S): at 22:07

## 2017-05-27 RX ADMIN — ATORVASTATIN CALCIUM 10 MILLIGRAM(S): 80 TABLET, FILM COATED ORAL at 22:07

## 2017-05-27 RX ADMIN — MEROPENEM 200 MILLIGRAM(S): 1 INJECTION INTRAVENOUS at 22:06

## 2017-05-27 RX ADMIN — MEROPENEM 200 MILLIGRAM(S): 1 INJECTION INTRAVENOUS at 05:32

## 2017-05-27 RX ADMIN — MEROPENEM 200 MILLIGRAM(S): 1 INJECTION INTRAVENOUS at 13:29

## 2017-05-27 RX ADMIN — Medication 100 MILLIGRAM(S): at 13:29

## 2017-05-27 RX ADMIN — Medication 650 MILLIGRAM(S): at 02:52

## 2017-05-27 RX ADMIN — Medication 3 MILLILITER(S): at 22:07

## 2017-05-27 RX ADMIN — HEPARIN SODIUM 5000 UNIT(S): 5000 INJECTION INTRAVENOUS; SUBCUTANEOUS at 05:32

## 2017-05-27 RX ADMIN — Medication 1 TABLET(S): at 12:52

## 2017-05-27 RX ADMIN — HEPARIN SODIUM 5000 UNIT(S): 5000 INJECTION INTRAVENOUS; SUBCUTANEOUS at 22:06

## 2017-05-27 RX ADMIN — PHENYLEPHRINE HYDROCHLORIDE 15.3 MICROGRAM(S)/KG/MIN: 10 INJECTION INTRAVENOUS at 05:16

## 2017-05-27 RX ADMIN — Medication 100 MILLIGRAM(S): at 05:32

## 2017-05-27 RX ADMIN — SODIUM CHLORIDE 75 MILLILITER(S): 9 INJECTION INTRAMUSCULAR; INTRAVENOUS; SUBCUTANEOUS at 11:07

## 2017-05-27 NOTE — PROGRESS NOTE ADULT - ASSESSMENT
87y old F:  Septic Shock - On pressors  s/p Open Ronda  HTN  DM    Plan:  Cont ICU Care  Critically Ill  Septic Shock - On pressors  Keep MAP > 65  NPO / IVF  Monitor renal function - worsening  Strict I/O's  IV Abx per ID  DVT prophylaxis - Hep SQ

## 2017-05-27 NOTE — PROGRESS NOTE ADULT - SUBJECTIVE AND OBJECTIVE BOX
CC:Patient is a 87y old  Female who presents with a chief complaint of abdominal pain. (22 May 2017 10:30)      Subjective:  Pt seen and examined at bedside with chaperone/. Pt is AAOx3. Pt on pressor support. Pt states abd incisional pain. Pt denied c/o fever, chills, chest pain, SOB, N/V/D, extremity pain or dysfunction, hemoptysis, hematemesis, hematuria, hematochexia, headache, diplopia, vertigo, dizzyness. Pt tolerating diet, (+) inman    Pt s/p 2 unit PRBC transfusion 5/25/17    ROS:  abd pain, otherwise negative ROS    Vital Signs Last 24 Hrs  T(C): 37.1, Max: 37.6 (05-26 @ 17:00)  T(F): 98.7, Max: 99.6 (05-26 @ 17:00)  HR: 95 (85 - 112)  BP: 80/53 (77/60 - 146/92)  BP(mean): 59 (43 - 101)  RR: 23 (13 - 28)  SpO2: 96% (96% - 100%)    Labs:      CARDIAC MARKERS ( 25 May 2017 21:16 )  <0.015 ng/mL / x     / 150 U/L / x     / x                                10.5   12.3  )-----------( 167      ( 27 May 2017 09:34 )             31.8     CBC Full  -  ( 27 May 2017 09:34 )  WBC Count : 12.3 K/uL  Hemoglobin : 10.5 g/dL  Hematocrit : 31.8 %  Platelet Count - Automated : 167 K/uL  Mean Cell Volume : 96.3 fl  Mean Cell Hemoglobin : 31.9 pg  Mean Cell Hemoglobin Concentration : 33.1 gm/dL  Auto Neutrophil # : x  Auto Lymphocyte # : x  Auto Monocyte # : x  Auto Eosinophil # : x  Auto Basophil # : x  Auto Neutrophil % : x  Auto Lymphocyte % : x  Auto Monocyte % : x  Auto Eosinophil % : x  Auto Basophil % : x    05-27    140  |  116<H>  |  28<H>  ----------------------------<  90  4.0   |  10<LL>  |  1.86<H>    Ca    6.7<L>      27 May 2017 07:28    TPro  6.3  /  Alb  2.3<L>  /  TBili  1.1  /  DBili  x   /  AST  67<H>  /  ALT  26  /  AlkPhos  79  05-26    LIVER FUNCTIONS - ( 26 May 2017 04:48 )  Alb: 2.3 g/dL / Pro: 6.3 gm/dL / ALK PHOS: 79 U/L / ALT: 26 U/L / AST: 67 U/L / GGT: x                 Meds:  acetaminophen   Tablet 650milliGRAM(s) Oral every 6 hours PRN  acetaminophen   Tablet. 650milliGRAM(s) Oral every 6 hours PRN  docusate sodium 100milliGRAM(s) Oral three times a day  insulin lispro (HumaLOG) corrective regimen sliding scale  SubCutaneous three times a day before meals  insulin lispro (HumaLOG) corrective regimen sliding scale  SubCutaneous at bedtime  dextrose 5%. 1000milliLiter(s) IV Continuous <Continuous>  dextrose Gel 1Dose(s) Oral once PRN  dextrose 50% Injectable 12.5Gram(s) IV Push once  dextrose 50% Injectable 25Gram(s) IV Push once  dextrose 50% Injectable 25Gram(s) IV Push once  glucagon  Injectable 1milliGRAM(s) IntraMuscular once PRN  losartan 25milliGRAM(s) Oral daily  atorvastatin 10milliGRAM(s) Oral at bedtime  metoprolol succinate ER 50milliGRAM(s) Oral daily  polyethylene glycol 3350 17Gram(s) Oral daily  pantoprazole    Tablet 40milliGRAM(s) Oral before breakfast  multivitamin 1Tablet(s) Oral daily  heparin  Injectable 5000Unit(s) SubCutaneous every 8 hours  HYDROmorphone  Injectable 1milliGRAM(s) IV Push every 4 hours PRN  oxyCODONE IR 5milliGRAM(s) Oral every 6 hours PRN  phenylephrine    Infusion 0.5MICROgram(s)/kG/Min IV Continuous <Continuous>  sodium chloride 0.9%. 1000milliLiter(s) IV Continuous <Continuous>  meropenem IVPB  IV Intermittent   meropenem IVPB 500milliGRAM(s) IV Intermittent every 8 hours  sodium chloride 0.9% Bolus 500milliLiter(s) IV Bolus once      Radiology:      Physical exam:  Pt in no acute distress  Resp: (+) rhonchi b/l  CVS: S1S2(+)  ABD: bowel sounds (+), soft, non distended, no rebound, no guarding, no rigidity, no skin changes to exam. Incision sites are clean, dry, intact, no cellulitis, no d/c, no purulence, no fluctuance. (+) appropriate incisional tenderness to exam. Drain demonstrates appropriate serosanguinous output  EXT: no calf tenderness or edema to exam b/l, on VTE prophylaxis  Skin: no skin changes to exam, no jaundice or icteric sclera b/l

## 2017-05-27 NOTE — PROGRESS NOTE ADULT - ASSESSMENT
86 y/o Female with h/o DM type 2, HL, HTN was admitted on 5/22 for worsening abdominal pain. She has left flank pain which wraps around to right side beneath diaphragm. In ED she received Zosyn.     1. Hypotension. Probable sepsis. Acute gallstone cholecystitis s/p surgery. Allergy to PCN.  -febrile syndrome is improving  -leukocytosis improving  -s/p cipro 400 mg IV q12h s/p metronidazole 500 mg IV q8h # 5  -on meropenem IV # 2  -tolerating abx well so far; no side effects noted   -monitor closely due to PCN allergy history  -continue abx ocoverage  -f/u repeat BC x 2  -monitor temps  -f/u CBC  -supportive care  2. Other issues: DM type 2, HL, HTN  -care per medicine

## 2017-05-27 NOTE — PROGRESS NOTE ADULT - ASSESSMENT
A/P:  S/P open cholecystectomy for gangrenous cholecystitis  Critical care management per Intensivist  GI/DVT prophylaxis  Pain control  Incentive spirometry  Serial abd exams  Drain care  F/U labs  Cont current care and meds  ID on consult, antibiotics per ID

## 2017-05-27 NOTE — PROGRESS NOTE ADULT - SUBJECTIVE AND OBJECTIVE BOX
Patient is a 87y old  Female who presents with a chief complaint of abdominal pain. (22 May 2017 10:30)    HPI:  86 y/o Female with h/o DM type 2, HL, HTN was admitted on  for worsening abdominal pain. She has left flank pain which wraps around to right side beneath diaphragm. In ED she received Zosyn.  US (+) gallstone and sludge.  CT AB/pelvis (+) gallstone and sludge in gallbladder neck.  (+) inflammation.  lactate 2.6.      s/p surgery  Remains hypotensive on pressors  Has mild right abdominal pain  Drain in place    MEDICATIONS  (STANDING):  docusate sodium 100milliGRAM(s) Oral three times a day  insulin lispro (HumaLOG) corrective regimen sliding scale  SubCutaneous three times a day before meals  insulin lispro (HumaLOG) corrective regimen sliding scale  SubCutaneous at bedtime  dextrose 5%. 1000milliLiter(s) IV Continuous <Continuous>  dextrose 50% Injectable 12.5Gram(s) IV Push once  dextrose 50% Injectable 25Gram(s) IV Push once  dextrose 50% Injectable 25Gram(s) IV Push once  losartan 25milliGRAM(s) Oral daily  atorvastatin 10milliGRAM(s) Oral at bedtime  metoprolol succinate ER 50milliGRAM(s) Oral daily  polyethylene glycol 3350 17Gram(s) Oral daily  pantoprazole    Tablet 40milliGRAM(s) Oral before breakfast  multivitamin 1Tablet(s) Oral daily  heparin  Injectable 5000Unit(s) SubCutaneous every 8 hours  phenylephrine    Infusion 0.5MICROgram(s)/kG/Min IV Continuous <Continuous>  sodium chloride 0.9%. 1000milliLiter(s) IV Continuous <Continuous>  meropenem IVPB  IV Intermittent   meropenem IVPB 500milliGRAM(s) IV Intermittent every 8 hours    MEDICATIONS  (PRN):  acetaminophen   Tablet 650milliGRAM(s) Oral every 6 hours PRN For Temp greater than 38.5 C (101.3 F)  acetaminophen   Tablet. 650milliGRAM(s) Oral every 6 hours PRN Mild Pain (1 - 3)  dextrose Gel 1Dose(s) Oral once PRN Blood Glucose LESS THAN 70 milliGRAM(s)/deciliter  glucagon  Injectable 1milliGRAM(s) IntraMuscular once PRN Glucose LESS THAN 70 milligrams/deciliter  HYDROmorphone  Injectable 1milliGRAM(s) IV Push every 4 hours PRN Severe Pain  oxyCODONE IR 5milliGRAM(s) Oral every 6 hours PRN Moderate Pain      Vital Signs Last 24 Hrs  T(C): 37.2, Max: 37.6 ( @ 17:00)  T(F): 98.9, Max: 99.6 ( @ 17:00)  HR: 88 (77 - 112)  BP: 110/53 (77/60 - 146/92)  BP(mean): 65 (43 - 101)  RR: 19 (13 - 28)  SpO2: 99% (96% - 100%)    Physical Exam:    Constitutional: frail looking  HEENT: NC/AT, EOMI, PERRLA  Neck: supple  Back: no tenderness  Respiratory: clear  Cardiovascular: S1S2 regular, no murmurs  Abdomen: soft, RUQ abdomen tender, mild distended, positive BS; drain in place; postoperative wound is clean  Genitourinary: deferred  Rectal: deferred  Musculoskeletal: no muscle tenderness, no joint swelling or tenderness  Extremities: no pedal edema  Neurological: AxOx3, moving all extremities, no focal deficits  Skin: no rashes    Labs:                        10.5   12.3  )-----------( 167      ( 27 May 2017 09:34 )             31.8         140  |  116<H>  |  28<H>  ----------------------------<  90  4.0   |  10<LL>  |  1.86<H>    Ca    6.7<L>      27 May 2017 07:28    TPro  6.3  /  Alb  2.3<L>  /  TBili  1.1  /  DBili  x   /  AST  67<H>  /  ALT  26  /  AlkPhos  79                 11.1   18.8  )-----------( 212      ( 26 May 2017 04:48 )             32.6         134<L>  |  109<H>  |  23  ----------------------------<  174<H>  4.3   |  18<L>  |  2.25<H>    Ca    6.4<LL>      26 May 2017 04:48    TPro  6.3  /  Alb  2.3<L>  /  TBili  1.1  /  DBili  x   /  AST  67<H>  /  ALT  26  /  AlkPhos  79             Cultures:              11.5   15.2  )-----------( 204      ( 24 May 2017 05:55 )             33.5     05-24    138  |  107  |  10  ----------------------------<  141<H>  4.2   |  22  |  1.06    Ca    7.8<L>      24 May 2017 05:55    TPro  6.8  /  Alb  2.8<L>  /  TBili  1.6<H>  /  DBili  x   /  AST  24  /  ALT  22  /  AlkPhos  86             Cultures:              10.6   5.6   )-----------( 209      ( 23 May 2017 06:23 )             33.4     05-23    143  |  111<H>  |  11  ----------------------------<  129<H>  5.0   |  23  |  0.99    Ca    8.1<L>      23 May 2017 06:23  Mg     1.7         TPro  6.8  /  Alb  2.8<L>  /  TBili  1.1  /  DBili  0.2  /  AST  18  /  ALT  22  /  AlkPhos  83                 12.3   7.7   )-----------( 272      ( 21 May 2017 22:34 )             38.0         138  |  108  |  20  ----------------------------<  143<H>  5.2   |  24  |  1.34<H>    Ca    8.3<L>      21 May 2017 22:34  Mg     1.7         TPro  8.2  /  Alb  3.5  /  TBili  0.6  /  DBili  x   /  AST  26  /  ALT  26  /  AlkPhos  117       LIVER FUNCTIONS - ( 21 May 2017 22:34 )  Alb: 3.5 g/dL / Pro: 8.2 gm/dL / ALK PHOS: 117 U/L / ALT: 26 U/L / AST: 26 U/L / GGT: x           Urinalysis Basic - ( 22 May 2017 02:04 )    Color: Yellow / Appearance: Clear / S.010 / pH: x  Gluc: x / Ketone: Negative  / Bili: Negative / Urobili: Negative mg/dL   Blood: x / Protein: Negative mg/dL / Nitrite: Negative   Leuk Esterase: Negative / RBC: 3-5 /HPF / WBC 0-2   Sq Epi: x / Non Sq Epi: Negative / Bacteria: Occasional      Radiology:    CT abdomen and pelvis:   Gallstones and/or sludge with gallstones suggested in the gallbladder   neck. Predominantly pericholecystic inflammatory change, increased from   prior study. Findings could represent acute cholecystitis. Consider   further evaluation with nuclear medicine DISIDA scan which is more   sensitive for the detection of such diagnosis.    HIDA:  Abnormal morphine-augmented hepatobiliary scan.  Findings consistent with acute cholecystitis.    Advanced directives addressed: full resuscitation

## 2017-05-28 LAB
-  AMIKACIN: SIGNIFICANT CHANGE UP
-  AMPICILLIN/SULBACTAM: SIGNIFICANT CHANGE UP
-  AMPICILLIN: SIGNIFICANT CHANGE UP
-  AZTREONAM: SIGNIFICANT CHANGE UP
-  CEFAZOLIN: SIGNIFICANT CHANGE UP
-  CEFEPIME: SIGNIFICANT CHANGE UP
-  CEFOXITIN: SIGNIFICANT CHANGE UP
-  CEFTAZIDIME: SIGNIFICANT CHANGE UP
-  CEFTRIAXONE: SIGNIFICANT CHANGE UP
-  CIPROFLOXACIN: SIGNIFICANT CHANGE UP
-  ERTAPENEM: SIGNIFICANT CHANGE UP
-  ERYTHROMYCIN: SIGNIFICANT CHANGE UP
-  ERYTHROMYCIN: SIGNIFICANT CHANGE UP
-  GENTAMICIN SYNERGY: SIGNIFICANT CHANGE UP
-  GENTAMICIN SYNERGY: SIGNIFICANT CHANGE UP
-  GENTAMICIN: SIGNIFICANT CHANGE UP
-  IMIPENEM: SIGNIFICANT CHANGE UP
-  LEVOFLOXACIN: SIGNIFICANT CHANGE UP
-  MEROPENEM: SIGNIFICANT CHANGE UP
-  PIPERACILLIN/TAZOBACTAM: SIGNIFICANT CHANGE UP
-  TETRACYCLINE: SIGNIFICANT CHANGE UP
-  TETRACYCLINE: SIGNIFICANT CHANGE UP
-  TOBRAMYCIN: SIGNIFICANT CHANGE UP
-  TRIMETHOPRIM/SULFAMETHOXAZOLE: SIGNIFICANT CHANGE UP
-  VANCOMYCIN: SIGNIFICANT CHANGE UP
ANION GAP SERPL CALC-SCNC: 19 MMOL/L — HIGH (ref 5–17)
BASE EXCESS BLDA CALC-SCNC: -25 MMOL/L — LOW (ref -2–2)
BASE EXCESS BLDA CALC-SCNC: -26 MMOL/L — LOW (ref -2–2)
BASE EXCESS BLDV CALC-SCNC: -11.1 MMOL/L — LOW (ref -2–2)
BASE EXCESS BLDV CALC-SCNC: -27.6 MMOL/L — LOW (ref -2–2)
BLOOD GAS COMMENTS ARTERIAL: SIGNIFICANT CHANGE UP
BLOOD GAS COMMENTS ARTERIAL: SIGNIFICANT CHANGE UP
BUN SERPL-MCNC: 32 MG/DL — HIGH (ref 7–23)
CALCIUM SERPL-MCNC: 7.3 MG/DL — LOW (ref 8.5–10.1)
CHLORIDE SERPL-SCNC: 114 MMOL/L — HIGH (ref 96–108)
CO2 SERPL-SCNC: 6 MMOL/L — CRITICAL LOW (ref 22–31)
CREAT SERPL-MCNC: 2.15 MG/DL — HIGH (ref 0.5–1.3)
CULTURE RESULTS: SIGNIFICANT CHANGE UP
GAS PNL BLDA: SIGNIFICANT CHANGE UP
GAS PNL BLDA: SIGNIFICANT CHANGE UP
GLUCOSE SERPL-MCNC: 107 MG/DL — HIGH (ref 70–99)
GRAM STN FLD: SIGNIFICANT CHANGE UP
HCO3 BLDA-SCNC: 5 MMOL/L — LOW (ref 21–29)
HCO3 BLDA-SCNC: 6 MMOL/L — LOW (ref 21–29)
HCO3 BLDV-SCNC: 14 MMOL/L — LOW (ref 21–29)
HCO3 BLDV-SCNC: 2 MMOL/L — LOW (ref 21–29)
HCT VFR BLD CALC: 33.8 % — LOW (ref 34.5–45)
HGB BLD-MCNC: 10.7 G/DL — LOW (ref 11.5–15.5)
HOROWITZ INDEX BLDA+IHG-RTO: 100 — SIGNIFICANT CHANGE UP
LACTATE SERPL-SCNC: 4.5 MMOL/L — CRITICAL HIGH (ref 0.7–2)
MCHC RBC-ENTMCNC: 31.6 GM/DL — LOW (ref 32–36)
MCHC RBC-ENTMCNC: 31.6 PG — SIGNIFICANT CHANGE UP (ref 27–34)
MCV RBC AUTO: 99.9 FL — SIGNIFICANT CHANGE UP (ref 80–100)
METHOD TYPE: SIGNIFICANT CHANGE UP
ORGANISM # SPEC MICROSCOPIC CNT: SIGNIFICANT CHANGE UP
PCO2 BLDA: 23 MMHG — LOW (ref 32–46)
PCO2 BLDA: 30 MMHG — LOW (ref 32–46)
PCO2 BLDV: 12 MMHG — LOW (ref 35–50)
PCO2 BLDV: 30 MMHG — LOW (ref 35–50)
PH BLDA: 6.94 — CRITICAL LOW (ref 7.35–7.45)
PH BLDA: 6.97 — CRITICAL LOW (ref 7.35–7.45)
PH BLDV: 6.92 — CRITICAL LOW (ref 7.35–7.45)
PH BLDV: 7.29 — LOW (ref 7.35–7.45)
PLATELET # BLD AUTO: 197 K/UL — SIGNIFICANT CHANGE UP (ref 150–400)
PO2 BLDA: 348 — SIGNIFICANT CHANGE UP
PO2 BLDA: 92 — SIGNIFICANT CHANGE UP
PO2 BLDV: 208 MMHG — HIGH (ref 25–45)
PO2 BLDV: 83 MMHG — HIGH (ref 25–45)
POTASSIUM SERPL-MCNC: 4.6 MMOL/L — SIGNIFICANT CHANGE UP (ref 3.5–5.3)
POTASSIUM SERPL-SCNC: 4.6 MMOL/L — SIGNIFICANT CHANGE UP (ref 3.5–5.3)
RBC # BLD: 3.39 M/UL — LOW (ref 3.8–5.2)
RBC # FLD: 17 % — HIGH (ref 10.3–14.5)
SAO2 % BLDA: 94 — SIGNIFICANT CHANGE UP
SAO2 % BLDA: 99 — SIGNIFICANT CHANGE UP
SAO2 % BLDV: 100 % — HIGH (ref 67–88)
SAO2 % BLDV: 91 % — HIGH (ref 67–88)
SODIUM SERPL-SCNC: 139 MMOL/L — SIGNIFICANT CHANGE UP (ref 135–145)
SPECIMEN SOURCE: SIGNIFICANT CHANGE UP
WBC # BLD: 12.3 K/UL — HIGH (ref 3.8–10.5)
WBC # FLD AUTO: 12.3 K/UL — HIGH (ref 3.8–10.5)

## 2017-05-28 PROCEDURE — 93010 ELECTROCARDIOGRAM REPORT: CPT

## 2017-05-28 PROCEDURE — 71010: CPT | Mod: 26

## 2017-05-28 PROCEDURE — 71010: CPT | Mod: 26,77

## 2017-05-28 RX ORDER — NOREPINEPHRINE BITARTRATE/D5W 8 MG/250ML
0.5 PLASTIC BAG, INJECTION (ML) INTRAVENOUS
Qty: 8 | Refills: 0 | Status: DISCONTINUED | OUTPATIENT
Start: 2017-05-28 | End: 2017-05-31

## 2017-05-28 RX ORDER — SODIUM BICARBONATE 1 MEQ/ML
0.28 SYRINGE (ML) INTRAVENOUS
Qty: 150 | Refills: 0 | Status: DISCONTINUED | OUTPATIENT
Start: 2017-05-28 | End: 2017-05-29

## 2017-05-28 RX ORDER — CHLORHEXIDINE GLUCONATE 213 G/1000ML
5 SOLUTION TOPICAL
Qty: 0 | Refills: 0 | Status: DISCONTINUED | OUTPATIENT
Start: 2017-05-28 | End: 2017-06-01

## 2017-05-28 RX ORDER — AMIODARONE HYDROCHLORIDE 400 MG/1
150 TABLET ORAL ONCE
Qty: 0 | Refills: 0 | Status: COMPLETED | OUTPATIENT
Start: 2017-05-28 | End: 2017-05-28

## 2017-05-28 RX ORDER — CHLORHEXIDINE GLUCONATE 213 G/1000ML
15 SOLUTION TOPICAL
Qty: 0 | Refills: 0 | Status: DISCONTINUED | OUTPATIENT
Start: 2017-05-28 | End: 2017-05-28

## 2017-05-28 RX ORDER — SODIUM CHLORIDE 9 MG/ML
500 INJECTION INTRAMUSCULAR; INTRAVENOUS; SUBCUTANEOUS ONCE
Qty: 0 | Refills: 0 | Status: COMPLETED | OUTPATIENT
Start: 2017-05-28 | End: 2017-05-28

## 2017-05-28 RX ORDER — MEROPENEM 1 G/30ML
500 INJECTION INTRAVENOUS EVERY 12 HOURS
Qty: 0 | Refills: 0 | Status: DISCONTINUED | OUTPATIENT
Start: 2017-05-28 | End: 2017-06-01

## 2017-05-28 RX ORDER — PANTOPRAZOLE SODIUM 20 MG/1
40 TABLET, DELAYED RELEASE ORAL DAILY
Qty: 0 | Refills: 0 | Status: DISCONTINUED | OUTPATIENT
Start: 2017-05-28 | End: 2017-06-01

## 2017-05-28 RX ORDER — AMIODARONE HYDROCHLORIDE 400 MG/1
1 TABLET ORAL
Qty: 900 | Refills: 0 | Status: DISCONTINUED | OUTPATIENT
Start: 2017-05-28 | End: 2017-06-01

## 2017-05-28 RX ORDER — SODIUM BICARBONATE 1 MEQ/ML
0.28 SYRINGE (ML) INTRAVENOUS
Qty: 150 | Refills: 0 | Status: DISCONTINUED | OUTPATIENT
Start: 2017-05-28 | End: 2017-05-28

## 2017-05-28 RX ORDER — SODIUM CHLORIDE 9 MG/ML
1000 INJECTION INTRAMUSCULAR; INTRAVENOUS; SUBCUTANEOUS
Qty: 0 | Refills: 0 | Status: DISCONTINUED | OUTPATIENT
Start: 2017-05-28 | End: 2017-05-28

## 2017-05-28 RX ORDER — PROPOFOL 10 MG/ML
5 INJECTION, EMULSION INTRAVENOUS
Qty: 1000 | Refills: 0 | Status: DISCONTINUED | OUTPATIENT
Start: 2017-05-28 | End: 2017-06-01

## 2017-05-28 RX ADMIN — PHENYLEPHRINE HYDROCHLORIDE 15.3 MICROGRAM(S)/KG/MIN: 10 INJECTION INTRAVENOUS at 01:11

## 2017-05-28 RX ADMIN — PHENYLEPHRINE HYDROCHLORIDE 15.3 MICROGRAM(S)/KG/MIN: 10 INJECTION INTRAVENOUS at 15:45

## 2017-05-28 RX ADMIN — Medication 650 MILLIGRAM(S): at 04:37

## 2017-05-28 RX ADMIN — AMIODARONE HYDROCHLORIDE 33.33 MG/MIN: 400 TABLET ORAL at 22:38

## 2017-05-28 RX ADMIN — SODIUM CHLORIDE 1000 MILLILITER(S): 9 INJECTION INTRAMUSCULAR; INTRAVENOUS; SUBCUTANEOUS at 09:06

## 2017-05-28 RX ADMIN — CHLORHEXIDINE GLUCONATE 15 MILLILITER(S): 213 SOLUTION TOPICAL at 18:00

## 2017-05-28 RX ADMIN — HYDROMORPHONE HYDROCHLORIDE 1 MILLIGRAM(S): 2 INJECTION INTRAMUSCULAR; INTRAVENOUS; SUBCUTANEOUS at 05:10

## 2017-05-28 RX ADMIN — PHENYLEPHRINE HYDROCHLORIDE 15.3 MICROGRAM(S)/KG/MIN: 10 INJECTION INTRAVENOUS at 07:12

## 2017-05-28 RX ADMIN — PHENYLEPHRINE HYDROCHLORIDE 15.3 MICROGRAM(S)/KG/MIN: 10 INJECTION INTRAVENOUS at 22:38

## 2017-05-28 RX ADMIN — Medication 76.5 MICROGRAM(S)/KG/MIN: at 13:10

## 2017-05-28 RX ADMIN — Medication 150 MEQ/KG/HR: at 20:07

## 2017-05-28 RX ADMIN — AMIODARONE HYDROCHLORIDE 618 MILLIGRAM(S): 400 TABLET ORAL at 01:16

## 2017-05-28 RX ADMIN — Medication 1 TABLET(S): at 12:14

## 2017-05-28 RX ADMIN — Medication 76.5 MICROGRAM(S)/KG/MIN: at 16:55

## 2017-05-28 RX ADMIN — POLYETHYLENE GLYCOL 3350 17 GRAM(S): 17 POWDER, FOR SOLUTION ORAL at 12:14

## 2017-05-28 RX ADMIN — Medication 150 MEQ/KG/HR: at 22:37

## 2017-05-28 RX ADMIN — HYDROMORPHONE HYDROCHLORIDE 1 MILLIGRAM(S): 2 INJECTION INTRAMUSCULAR; INTRAVENOUS; SUBCUTANEOUS at 04:52

## 2017-05-28 RX ADMIN — Medication 650 MILLIGRAM(S): at 05:37

## 2017-05-28 RX ADMIN — PROPOFOL 2.45 MICROGRAM(S)/KG/MIN: 10 INJECTION, EMULSION INTRAVENOUS at 22:37

## 2017-05-28 RX ADMIN — AMIODARONE HYDROCHLORIDE 33.33 MG/MIN: 400 TABLET ORAL at 01:37

## 2017-05-28 RX ADMIN — MEROPENEM 200 MILLIGRAM(S): 1 INJECTION INTRAVENOUS at 18:02

## 2017-05-28 RX ADMIN — HEPARIN SODIUM 5000 UNIT(S): 5000 INJECTION INTRAVENOUS; SUBCUTANEOUS at 15:45

## 2017-05-28 RX ADMIN — HEPARIN SODIUM 5000 UNIT(S): 5000 INJECTION INTRAVENOUS; SUBCUTANEOUS at 05:50

## 2017-05-28 RX ADMIN — CHLORHEXIDINE GLUCONATE 15 MILLILITER(S): 213 SOLUTION TOPICAL at 12:12

## 2017-05-28 RX ADMIN — MEROPENEM 200 MILLIGRAM(S): 1 INJECTION INTRAVENOUS at 05:50

## 2017-05-28 NOTE — CONSULT NOTE ADULT - CONSULT REQUESTED DATE/TIME
22-May-2017 16:41
25-May-2017 16:23
24-May-2017
25-May-2017 08:47
26-May-2017 02:26
28-May-2017 10:48
23-May-2017

## 2017-05-28 NOTE — PROCEDURE NOTE - NSPROCDETAILS_GEN_ALL_CORE
patient pre-oxygenated, tube inserted, placement confirmed
ultrasound guidance/sterile dressing applied/guidewire recovered/lumen(s) aspirated and flushed/sterile technique, catheter placed

## 2017-05-28 NOTE — PROGRESS NOTE ADULT - SUBJECTIVE AND OBJECTIVE BOX
CC:Patient is a 87y old  Female who presents with a chief complaint of abdominal pain. (22 May 2017 10:30)      Subjective:  Pt seen and examined at bedside with chaperone/son. Pt is intubated on mechanical ventilation, on pressor support.     ROS:  unable to obtain secondary to intubation and mechanical ventilation    Vital Signs Last 24 Hrs  T(C): 36.9, Max: 37.3 (05-27 @ 22:00)  T(F): 98.5, Max: 99.2 (05-28 @ 02:00)  HR: 82 (82 - 148)  BP: 99/58 (59/30 - 121/102)  BP(mean): 67 (38 - 106)  RR: 7 (4 - 32)  SpO2: 81% (71% - 100%)    Labs:    ABG - ( 28 May 2017 11:34 )  pH: 6.97  /  pCO2: 23    /  pO2: 348   / HCO3: 5     / Base Excess: -26   /  SaO2: 99                            10.7   12.3  )-----------( 197      ( 28 May 2017 07:04 )             33.8     CBC Full  -  ( 28 May 2017 07:04 )  WBC Count : 12.3 K/uL  Hemoglobin : 10.7 g/dL  Hematocrit : 33.8 %  Platelet Count - Automated : 197 K/uL  Mean Cell Volume : 99.9 fl  Mean Cell Hemoglobin : 31.6 pg  Mean Cell Hemoglobin Concentration : 31.6 gm/dL  Auto Neutrophil # : x  Auto Lymphocyte # : x  Auto Monocyte # : x  Auto Eosinophil # : x  Auto Basophil # : x  Auto Neutrophil % : x  Auto Lymphocyte % : x  Auto Monocyte % : x  Auto Eosinophil % : x  Auto Basophil % : x    05-28    139  |  114<H>  |  32<H>  ----------------------------<  107<H>  4.6   |  6<LL>  |  2.15<H>    Ca    7.3<L>      28 May 2017 07:04              Meds:  acetaminophen   Tablet 650milliGRAM(s) Oral every 6 hours PRN  acetaminophen   Tablet. 650milliGRAM(s) Oral every 6 hours PRN  docusate sodium 100milliGRAM(s) Oral three times a day  insulin lispro (HumaLOG) corrective regimen sliding scale  SubCutaneous three times a day before meals  insulin lispro (HumaLOG) corrective regimen sliding scale  SubCutaneous at bedtime  dextrose 5%. 1000milliLiter(s) IV Continuous <Continuous>  dextrose Gel 1Dose(s) Oral once PRN  dextrose 50% Injectable 12.5Gram(s) IV Push once  dextrose 50% Injectable 25Gram(s) IV Push once  dextrose 50% Injectable 25Gram(s) IV Push once  glucagon  Injectable 1milliGRAM(s) IntraMuscular once PRN  atorvastatin 10milliGRAM(s) Oral at bedtime  polyethylene glycol 3350 17Gram(s) Oral daily  pantoprazole    Tablet 40milliGRAM(s) Oral before breakfast  multivitamin 1Tablet(s) Oral daily  heparin  Injectable 5000Unit(s) SubCutaneous every 8 hours  HYDROmorphone  Injectable 1milliGRAM(s) IV Push every 4 hours PRN  phenylephrine    Infusion 0.5MICROgram(s)/kG/Min IV Continuous <Continuous>  ALBUTerol/ipratropium for Nebulization 3milliLiter(s) Nebulizer two times a day  amiodarone Infusion 1mG/Min IV Continuous <Continuous>  propofol Infusion 5MICROgram(s)/kG/Min IV Continuous <Continuous>  chlorhexidine 0.12% Liquid 15milliLiter(s) Swish and Spit two times a day  sodium chloride 0.9% Bolus 500milliLiter(s) IV Bolus once  meropenem IVPB 500milliGRAM(s) IV Intermittent every 12 hours  sodium bicarbonate  Infusion 0.276mEq/kG/Hr IV Continuous <Continuous>  norepinephrine Infusion 0.5MICROgram(s)/kG/Min IV Continuous <Continuous>      Radiology:  pending ct chest/abd/pelvis    Physical exam:  Resp: (+) b/l air entry on mechanical ventilation  CVS: S1S2(+)  ABD: bowel sounds (+), soft, non distended grossly, no gross rebound/guarding/rigidity, no skin changes to exam. Incision sites are clean, dry, intact, no cellulitis, no d/c, no purulence, no fluctuance. Drain demonstrates appropriate serous output  EXT: no calf tenderness or edema to exam b/l, on VTE prophylaxis  Skin: no skin changes to exam, no gross jaundice or icteric sclera b/l

## 2017-05-28 NOTE — CONSULT NOTE ADULT - SUBJECTIVE AND OBJECTIVE BOX
8 y/o Female with h/o DM type 2, HL, HTN was admitted on 5/22 for worsening abdominal pain. She has left flank pain which wraps around to right side beneath diaphragm. In ED she received Zosyn.  US (+) gallstone and sludge.  CT AB/pelvis (+) gallstone and sludge in gallbladder neck.  (+) inflammation.  lactate 2.6.    PSH: as above  [facility]: as above  eds: per reconciliation sheet, noted below  MEDICATIONS  (STANDING):  heparin  Injectable 5000Unit(s) SubCutaneous every 12 hours  sodium chloride 0.9%. 1000milliLiter(s) IV Continuous <Continuous>  docusate sodium 100milliGRAM(s) Oral three times a day  insulin lispro (HumaLOG) corrective regimen sliding scale  SubCutaneous three times a day before meals  insulin lispro (HumaLOG) corrective regimen sliding scale  SubCutaneous at bedtime  dextrose 5%. 1000milliLiter(s) IV Continuous <Continuous>  dextrose 50% Injectable 12.5Gram(s) IV Push once  dextrose 50% Injectable 25Gram(s) IV Push once  dextrose 50% Injectable 25Gram(s) IV Push once  ciprofloxacin     100 mG/mL Suspension 400milliGRAM(s) Oral every 12 hours  metroNIDAZOLE  IVPB 500milliGRAM(s) IV Intermittent every 8 hours  aspirin 325milliGRAM(s) Oral daily  losartan 25milliGRAM(s) Oral daily  atorvastatin 10milliGRAM(s) Oral at bedtime  metoprolol succinate ER 50milliGRAM(s) Oral daily  polyethylene glycol 3350 17Gram(s) Oral daily  pantoprazole    Tablet 40milliGRAM(s) Oral before breakfast  multivitamin 1Tablet(s) Oral daily    Allergies    penicillins (Unknown)    Intolerances      Social: no smoking, no alcohol, no illegal drugs; no recent travel, no exposure to TB  FAMILY HISTORY:    ROS: the patient denies fever, no chills, no HA, no dizziness, no sore throat, no blurry vision, no CP, no palpitations, no SOB, no cough, has right abdominal pain, no diarrhea, no N/V, no dysuria, no leg pain, no claudication, no rash, no joint aches, no rectal pain or bleeding, no night sweats  PE:    Constitutional: frail looking  HEENT: NC/AT, EOMI, PERRLA  Neck: supple  Back: no tenderness  Respiratory: clear  Cardiovascular: S1S2 regular, no murmurs  Abdomen: soft, RUQ abdomen tender, not distended, positive BS  Genitourinary: deferred  Rectal: deferred  Musculoskeletal: no muscle tenderness, no joint swelling or tenderness  Extremities: no pedal edema  Neurological: AxOx3, moving all extremities, no focal deficits  Skin: no rashes  Radiology:    CT abdomen and pelvis:   Gallstones and/or sludge with gallstones suggested in the gallbladder   neck. Predominantly pericholecystic inflammatory change, increased from   prior study. Findings could represent acute cholecystitis. Consider   further evaluation with nuclear medicine DISIDA scan which is more   sensitive for the detection of such diagnosis.    HIDA:  Abnormal morphine-augmented hepatobiliary scan.  Findings consistent with acute cholecystitis.
Patient is a 87y old  Female who presents with a chief complaint of abdominal pain. (22 May 2017 10:30)    HPI:  88 y/o Female with h/o DM type 2, HL, HTN was admitted on  for worsening abdominal pain. She has left flank pain which wraps around to right side beneath diaphragm. In ED she received Zosyn.  US (+) gallstone and sludge.  CT AB/pelvis (+) gallstone and sludge in gallbladder neck.  (+) inflammation.  lactate 2.6.          PMH: as above  PSH: as above  Meds: per reconciliation sheet, noted below  MEDICATIONS  (STANDING):  heparin  Injectable 5000Unit(s) SubCutaneous every 12 hours  sodium chloride 0.9%. 1000milliLiter(s) IV Continuous <Continuous>  docusate sodium 100milliGRAM(s) Oral three times a day  insulin lispro (HumaLOG) corrective regimen sliding scale  SubCutaneous three times a day before meals  insulin lispro (HumaLOG) corrective regimen sliding scale  SubCutaneous at bedtime  dextrose 5%. 1000milliLiter(s) IV Continuous <Continuous>  dextrose 50% Injectable 12.5Gram(s) IV Push once  dextrose 50% Injectable 25Gram(s) IV Push once  dextrose 50% Injectable 25Gram(s) IV Push once  ciprofloxacin     100 mG/mL Suspension 400milliGRAM(s) Oral every 12 hours  metroNIDAZOLE  IVPB 500milliGRAM(s) IV Intermittent every 8 hours  aspirin 325milliGRAM(s) Oral daily  losartan 25milliGRAM(s) Oral daily  atorvastatin 10milliGRAM(s) Oral at bedtime  metoprolol succinate ER 50milliGRAM(s) Oral daily  polyethylene glycol 3350 17Gram(s) Oral daily  pantoprazole    Tablet 40milliGRAM(s) Oral before breakfast  multivitamin 1Tablet(s) Oral daily    MEDICATIONS  (PRN):  acetaminophen   Tablet 650milliGRAM(s) Oral every 6 hours PRN For Temp greater than 38.5 C (101.3 F)  acetaminophen   Tablet. 650milliGRAM(s) Oral every 6 hours PRN Mild Pain (1 - 3)  dextrose Gel 1Dose(s) Oral once PRN Blood Glucose LESS THAN 70 milliGRAM(s)/deciliter  glucagon  Injectable 1milliGRAM(s) IntraMuscular once PRN Glucose LESS THAN 70 milligrams/deciliter    Allergies    penicillins (Unknown)    Intolerances      Social: no smoking, no alcohol, no illegal drugs; no recent travel, no exposure to TB  FAMILY HISTORY:    ROS: the patient denies fever, no chills, no HA, no dizziness, no sore throat, no blurry vision, no CP, no palpitations, no SOB, no cough, has right abdominal pain, no diarrhea, no N/V, no dysuria, no leg pain, no claudication, no rash, no joint aches, no rectal pain or bleeding, no night sweats    Vital Signs Last 24 Hrs  T(C): 36.7, Max: 36.7 ( @ 02:17)  T(F): 98, Max: 98 ( @ 02:17)  HR: 78 (72 - 78)  BP: 135/59 (135/59 - 190/69)  BP(mean): 87 (87 - 87)  RR: 18 (17 - 18)  SpO2: 96% (95% - 100%)  Daily Height in cm: 152.4 (21 May 2017 21:49)    Daily     PE:    Constitutional: frail looking  HEENT: NC/AT, EOMI, PERRLA  Neck: supple  Back: no tenderness  Respiratory: clear  Cardiovascular: S1S2 regular, no murmurs  Abdomen: soft, RUQ abdomen tender, not distended, positive BS  Genitourinary: deferred  Rectal: deferred  Musculoskeletal: no muscle tenderness, no joint swelling or tenderness  Extremities: no pedal edema  Neurological: AxOx3, moving all extremities, no focal deficits  Skin: no rashes    Labs:                        12.3   7.7   )-----------( 272      ( 21 May 2017 22:34 )             38.0         138  |  108  |  20  ----------------------------<  143<H>  5.2   |  24  |  1.34<H>    Ca    8.3<L>      21 May 2017 22:34  Mg     1.7         TPro  8.2  /  Alb  3.5  /  TBili  0.6  /  DBili  x   /  AST  26  /  ALT  26  /  AlkPhos  117       LIVER FUNCTIONS - ( 21 May 2017 22:34 )  Alb: 3.5 g/dL / Pro: 8.2 gm/dL / ALK PHOS: 117 U/L / ALT: 26 U/L / AST: 26 U/L / GGT: x           Urinalysis Basic - ( 22 May 2017 02:04 )    Color: Yellow / Appearance: Clear / S.010 / pH: x  Gluc: x / Ketone: Negative  / Bili: Negative / Urobili: Negative mg/dL   Blood: x / Protein: Negative mg/dL / Nitrite: Negative   Leuk Esterase: Negative / RBC: 3-5 /HPF / WBC 0-2   Sq Epi: x / Non Sq Epi: Negative / Bacteria: Occasional          Radiology:    CT abdomen and pelvis:   Gallstones and/or sludge with gallstones suggested in the gallbladder   neck. Predominantly pericholecystic inflammatory change, increased from   prior study. Findings could represent acute cholecystitis. Consider   further evaluation with nuclear medicine DISIDA scan which is more   sensitive for the detection of such diagnosis.    HIDA:  Abnormal morphine-augmented hepatobiliary scan.  Findings consistent with acute cholecystitis.    Advanced directives addressed: full resuscitation
87F. Patient presented to   c/o abdominal pain.  Left flank which wraps around to right side beneath diaphragm.         ED given MS + Zosyn + NS.  US (+) gallstone and sludge.  CT AB/pelvis (+) gallstone and sludge in gallbladder neck.  (+) inflammation.  lactate 2.6.      PAtient taken to the OR today and had to have an open jo-ann.  EBL 200cc.  Post OP the patient was hypotensive and had a decrease in H & H.  Currently in the ICU.  She is receiving 2 U PRBC.  UO has been poor.      PMHx:  type 2DM;  HTN;  hyperlipidemia.    PSHx:  hip sx.    ALL:  PCN-swelling of the lips.    Rx:  reviewed.    SocHx:  lives w/ son in the community.  able to ambulate w/ cane.  no tobacco.  no EtOH.    FHx:  NC.
HPI:Abdominal pain  87F.  c/o abdominal pain.  left flank which wraps arround to right side beneath diaphragm.   onset 1 day ago.  no provocative or palliative factors.  no a/w diet.    ED given MS + Zosyn + NS.  US (+) gallstone and sludge.  CT AB/pelvis (+) gallstone and sludge in gallbladder neck.  (+) inflammation.  lactate 2.6.      PMHx:  type 2DM;  HTN;  hyperlipidemia.    PSHx:  hip sx.    ALL:  PCN-swelling of the lips.    Rx:  reviewed.    SocHx:  lives w/ son in the community.  able to ambulate w/ cane.  no tobacco.  no EtOH.    FHx:  NC. (22 May 2017 10:30)      PAST MEDICAL & SURGICAL HISTORY:  Hyperlipidemia  Diabetes: DM II  Hypertension      MEDICATIONS  (STANDING):  heparin  Injectable 5000Unit(s) SubCutaneous every 12 hours  sodium chloride 0.9%. 1000milliLiter(s) IV Continuous <Continuous>  docusate sodium 100milliGRAM(s) Oral three times a day  insulin lispro (HumaLOG) corrective regimen sliding scale  SubCutaneous three times a day before meals  insulin lispro (HumaLOG) corrective regimen sliding scale  SubCutaneous at bedtime  dextrose 5%. 1000milliLiter(s) IV Continuous <Continuous>  dextrose 50% Injectable 12.5Gram(s) IV Push once  dextrose 50% Injectable 25Gram(s) IV Push once  dextrose 50% Injectable 25Gram(s) IV Push once  metroNIDAZOLE  IVPB 500milliGRAM(s) IV Intermittent every 8 hours  aspirin 325milliGRAM(s) Oral daily  losartan 25milliGRAM(s) Oral daily  atorvastatin 10milliGRAM(s) Oral at bedtime  metoprolol succinate ER 50milliGRAM(s) Oral daily  polyethylene glycol 3350 17Gram(s) Oral daily  pantoprazole    Tablet 40milliGRAM(s) Oral before breakfast  multivitamin 1Tablet(s) Oral daily  ciprofloxacin   IVPB 400milliGRAM(s) IV Intermittent every 12 hours    MEDICATIONS  (PRN):  acetaminophen   Tablet 650milliGRAM(s) Oral every 6 hours PRN For Temp greater than 38.5 C (101.3 F)  acetaminophen   Tablet. 650milliGRAM(s) Oral every 6 hours PRN Mild Pain (1 - 3)  dextrose Gel 1Dose(s) Oral once PRN Blood Glucose LESS THAN 70 milliGRAM(s)/deciliter  glucagon  Injectable 1milliGRAM(s) IntraMuscular once PRN Glucose LESS THAN 70 milligrams/deciliter      Allergies    penicillins (Unknown)    Intolerances        SOCIAL HISTORY:    FAMILY HISTORY:   Non-contributory    REVIEW OF SYSTEMS      General:	    Respiratory and Thorax:  	  Cardiovascular:	    Gastrointestinal:	    Musculoskeletal:	   Vital Signs Last 24 Hrs  T(C): 39, Max: 39.5 (05-25 @ 05:10)  T(F): 102.2, Max: 103.1 (05-25 @ 05:10)  HR: 89 (84 - 107)  BP: 109/50 (108/58 - 119/53)  BP(mean): --  RR: 18 (18 - 18)  SpO2: 97% (93% - 100%)    HEENT :No Pallor.No icterus. EOMI,PERLAA  Chest : Clear to Auscultation  CVS : S1S2 Normal.No murmurs.  Abdomen: Soft.epigastric tender .Normal bowel sounds.No Organomegaly.  CNS: Alert.Oriented to Time,Place and Person.No focal deficit.  EXT: Normal Range of motion.No pitting edema.    LABS:                        10.5   14.2  )-----------( 176      ( 25 May 2017 06:01 )             29.5     05-25    137  |  107  |  14  ----------------------------<  116<H>  3.8   |  18<L>  |  1.29    Ca    7.3<L>      25 May 2017 06:01    TPro  6.3  /  Alb  2.4<L>  /  TBili  1.1  /  DBili  x   /  AST  15  /  ALT  16  /  AlkPhos  65  05-25    PT/INR - ( 25 May 2017 06:01 )   PT: 20.1 sec;   INR: 1.84 ratio           LIVER FUNCTIONS - ( 25 May 2017 06:01 )  Alb: 2.4 g/dL / Pro: 6.3 gm/dL / ALK PHOS: 65 U/L / ALT: 16 U/L / AST: 15 U/L / GGT: x             RADIOLOGY & ADDITIONAL STUDIES:
NEPHROLOGY CONSULT  HPI:  87F.  c/o abdominal pain.  left flank which wraps arround to right side beneath diaphragm.   onset 1 day ago.  no provocative or palliative factors.  no a/w diet.    ED given MS + Zosyn + NS.  US (+) gallstone and sludge.  CT AB/pelvis (+) gallstone and sludge in gallbladder neck.  (+) inflammation.  lactate 2.6.    Pt developing vidya, metabolic acidosis, lactate improved.  pH 6.94, base excess -25  spoke to pts son he is agreeable to HD    PMHx:  type 2DM;  HTN;  hyperlipidemia.    PSHx:  hip sx.    ALL:  PCN-swelling of the lips.    Rx:  reviewed.    SocHx:  lives w/ son in the community.  able to ambulate w/ cane.  no tobacco.  no EtOH.    FHx:  NC. (22 May 2017 10:30)      PAST MEDICAL & SURGICAL HISTORY:  Hyperlipidemia  Diabetes: DM II  Hypertension      FAMILY HISTORY:      MEDICATIONS  (STANDING):  docusate sodium 100milliGRAM(s) Oral three times a day  insulin lispro (HumaLOG) corrective regimen sliding scale  SubCutaneous three times a day before meals  insulin lispro (HumaLOG) corrective regimen sliding scale  SubCutaneous at bedtime  dextrose 5%. 1000milliLiter(s) IV Continuous <Continuous>  dextrose 50% Injectable 12.5Gram(s) IV Push once  dextrose 50% Injectable 25Gram(s) IV Push once  dextrose 50% Injectable 25Gram(s) IV Push once  atorvastatin 10milliGRAM(s) Oral at bedtime  polyethylene glycol 3350 17Gram(s) Oral daily  pantoprazole    Tablet 40milliGRAM(s) Oral before breakfast  multivitamin 1Tablet(s) Oral daily  heparin  Injectable 5000Unit(s) SubCutaneous every 8 hours  phenylephrine    Infusion 0.5MICROgram(s)/kG/Min IV Continuous <Continuous>  sodium chloride 0.9%. 1000milliLiter(s) IV Continuous <Continuous>  meropenem IVPB  IV Intermittent   meropenem IVPB 500milliGRAM(s) IV Intermittent every 8 hours  ALBUTerol/ipratropium for Nebulization 3milliLiter(s) Nebulizer two times a day  amiodarone Infusion 1mG/Min IV Continuous <Continuous>  propofol Infusion 5MICROgram(s)/kG/Min IV Continuous <Continuous>  chlorhexidine 0.12% Liquid 15milliLiter(s) Swish and Spit two times a day  sodium chloride 0.9% Bolus 500milliLiter(s) IV Bolus once  sodium chloride 0.9%. 1000milliLiter(s) IV Continuous <Continuous>    MEDICATIONS  (PRN):  acetaminophen   Tablet 650milliGRAM(s) Oral every 6 hours PRN For Temp greater than 38.5 C (101.3 F)  acetaminophen   Tablet. 650milliGRAM(s) Oral every 6 hours PRN Mild Pain (1 - 3)  dextrose Gel 1Dose(s) Oral once PRN Blood Glucose LESS THAN 70 milliGRAM(s)/deciliter  glucagon  Injectable 1milliGRAM(s) IntraMuscular once PRN Glucose LESS THAN 70 milligrams/deciliter  HYDROmorphone  Injectable 1milliGRAM(s) IV Push every 4 hours PRN Severe Pain      Allergies    penicillins (Unknown)    Intolerances        I&O's Summary    I & Os for current day (as of 28 May 2017 10:49)  =============================================  IN: 5137.4 ml / OUT: 855 ml / NET: 4282.4 ml        REVIEW OF SYSTEMS:    CONSTITUTIONAL:  As per HPI.  CONSTITUTIONAL: No weakness, fevers or chills  EYES/ENT: No visual changes;  No vertigo or throat pain   NECK: No pain or stiffness  CARDIOVASCULAR: No chest pain or palpitations  GASTROINTESTINAL: No abdominal or epigastric pain. No nausea, vomiting, or hematemesis; No diarrhea or constipation. No melena or hematochezia.  GENITOURINARY: No dysuria, frequency or hematuria  NEUROLOGICAL: No numbness or weakness  SKIN: No itching, burning, rashes, or lesions   All other review of systems is negative unless indicated above      PHYSICAL EXAM:    General:  On vent not responsive    Neuro:  cannot evaluate    HEENT:  No JVD, Eyes anicteric, No carotid bruits.No lymphadenopathy,    Cardiovascular:  Regular rate and rhythm, with normal S1 and S2. No murmurs, rubs,  or gallops. No JVD.    Lungs:  Lungs clear. no rales, no wheezing, .    Abdomen:  soft, + bs     Skin:  Warm, dry, well-perfused. No rashes or other lesions.     Extremities:  edema, warm        Vital Signs Last 24 Hrs  T(C): 36.9, Max: 37.3 (05-27 @ 22:00)  T(F): 98.5, Max: 99.2 (05-28 @ 02:00)  HR: 84 (83 - 148)  BP: 81/35 (59/30 - 121/102)  BP(mean): 44 (38 - 106)  RR: 16 (4 - 32)  SpO2: 85% (71% - 100%)  Daily     Daily   I&O's Summary    I & Os for current day (as of 28 May 2017 10:49)  =============================================  IN: 5137.4 ml / OUT: 855 ml / NET: 4282.4 ml        LABS:                        10.7   12.3  )-----------( 197      ( 28 May 2017 07:04 )             33.8     05-28    139  |  114<H>  |  32<H>  ----------------------------<  107<H>  4.6   |  6<LL>  |  2.15<H>    Ca    7.3<L>      28 May 2017 07:04            ABG - ( 28 May 2017 08:22 )  pH: 6.94  /  pCO2: 30    /  pO2: 92    / HCO3: 6     / Base Excess: -25   /  SaO2: 94
Patient is a 87y old  Female who presents with a chief complaint of abdominal pain.     HPI:  87F.  c/o abdominal pain.  left flank which wraps arround to right side beneath diaphragm. Pt was noted to be septic and clinically deteriorating and was planned for emergent cholecystectomy by surgical team.    Cardiology consulted for evaluation prior to anticipated emergent surgery.      PMHx:  type 2DM;  HTN;  hyperlipidemia.    PSHx:  hip sx.    ALL:  PCN-swelling of the lips.    Rx:  reviewed.    SocHx:  lives w/ son in the community.  able to ambulate w/ cane.  no tobacco.  no EtOH.    FHx:  NC. (22 May 2017 10:30)      PAST MEDICAL & SURGICAL HISTORY:  Hyperlipidemia  Diabetes: DM II  Hypertension      MEDICATIONS  (STANDING):  heparin  Injectable 5000Unit(s) SubCutaneous every 12 hours  sodium chloride 0.9%. 1000milliLiter(s) IV Continuous <Continuous>  docusate sodium 100milliGRAM(s) Oral three times a day  insulin lispro (HumaLOG) corrective regimen sliding scale  SubCutaneous three times a day before meals  insulin lispro (HumaLOG) corrective regimen sliding scale  SubCutaneous at bedtime  dextrose 5%. 1000milliLiter(s) IV Continuous <Continuous>  dextrose 50% Injectable 12.5Gram(s) IV Push once  dextrose 50% Injectable 25Gram(s) IV Push once  dextrose 50% Injectable 25Gram(s) IV Push once  aspirin 325milliGRAM(s) Oral daily  losartan 25milliGRAM(s) Oral daily  atorvastatin 10milliGRAM(s) Oral at bedtime  metoprolol succinate ER 50milliGRAM(s) Oral daily  polyethylene glycol 3350 17Gram(s) Oral daily  pantoprazole    Tablet 40milliGRAM(s) Oral before breakfast  multivitamin 1Tablet(s) Oral daily  aztreonam  IVPB 1000milliGRAM(s) IV Intermittent every 8 hours  aztreonam  IVPB  IV Intermittent     MEDICATIONS  (PRN):  acetaminophen   Tablet 650milliGRAM(s) Oral every 6 hours PRN For Temp greater than 38.5 C (101.3 F)  acetaminophen   Tablet. 650milliGRAM(s) Oral every 6 hours PRN Mild Pain (1 - 3)  dextrose Gel 1Dose(s) Oral once PRN Blood Glucose LESS THAN 70 milliGRAM(s)/deciliter  glucagon  Injectable 1milliGRAM(s) IntraMuscular once PRN Glucose LESS THAN 70 milligrams/deciliter      FAMILY HISTORY:      SOCIAL HISTORY:  non smoker, no alcohol use      REVIEW OF SYSTEMS:  CONSTITUTIONAL:  No night sweats.  No fatigue, malaise, lethargy.  No fever or chills.  HEENT:  Eyes:  No visual changes.  No eye pain.      ENT:  No runny nose.  No epistaxis.  No sinus pain.  No sore throat.  No odynophagia.  No ear pain.  No congestion.  RESPIRATORY:  No cough.  No wheeze.  No hemoptysis.  No shortness of breath.  CARDIOVASCULAR:  No chest pains.  No palpitations. No shortness of breath, No orthopnea or PND.  GASTROINTESTINAL:  No abdominal pain.  No nausea or vomiting.  No diarrhea or constipation.  No hematemesis.  No hematochezia.  No melena.  GENITOURINARY:  No urgency.  No frequency.  No dysuria.  No hematuria.  No obstructive symptoms.  No discharge.  No pain.  No significant abnormal bleeding.  MUSCULOSKELETAL:  No musculoskeletal pain.  No joint swelling.  No arthritis.  NEUROLOGICAL:  No tingling or numbness or weakness.  PSYCHIATRIC:  No confusion  SKIN:  No rashes.  No lesions.  No wounds.  ENDOCRINE:  No unexplained weight loss.  No polydipsia.  No polyuria.  No polyphagia.  HEMATOLOGIC:  No anemia.  No purpura.  No petechiae.  No prolonged or excessive bleeding.   ALLERGIC AND IMMUNOLOGIC:  No pruritus.  No swelling.         Vital Signs Last 24 Hrs  T(C): 34.7, Max: 39.5 (05-25 @ 05:10)  T(F): 94.4, Max: 103.1 (05-25 @ 05:10)  HR: 116 (84 - 116)  BP: 98/50 (98/50 - 122/39)  BP(mean): --  RR: 17 (16 - 18)  SpO2: 95% (93% - 100%)    PHYSICAL EXAM-    Constitutional: The patient appears to be normal, well developed, well nourished and alert and oriented to time, place and person. The patient does not appear acutely ill.     Head: Head is normocephalic and atraumatic.      Neck: The patient's neck is supple without enlargement, has no palpable thyromegaly nor thyroid nodules and has no jugular venous distention. No audible carotid bruits. There are strong carotid pulses bilaterally. No JVD.     Cardiovascular: Regular rate and rhythm without S3, S4. No murmurs or rubs are appreciated.      Respiratory: Breath sounds are normal. No rales. No wheezing.    Abdomen: tender, nondistended with positive bowel sounds.      Extremity: trace  pitting edema b/l     Neurologic: The patient is alert and oriented.      Skin: No rash, no obvious lesions noted.      Psychiatric: The patient appears to be emotionally stable.      INTERPRETATION OF TELEMETRY:    ECG: sinus rythm with T wave inversion in V3 and flattening in V2.    I&O's Detail    I & Os for current day (as of 25 May 2017 16:24)  =============================================  IN:    IV PiggyBack: 500 ml    Oral Fluid: 460 ml    sodium chloride 0.9%.: 358 ml    Total IN: 1318 ml  ---------------------------------------------  OUT:    Total OUT: 0 ml  ---------------------------------------------  Total NET: 1318 ml      LABS:                        10.5   14.2  )-----------( 176      ( 25 May 2017 06:01 )             29.5     05-25    137  |  107  |  14  ----------------------------<  116<H>  3.8   |  18<L>  |  1.29    Ca    7.3<L>      25 May 2017 06:01    TPro  6.3  /  Alb  2.4<L>  /  TBili  1.1  /  DBili  x   /  AST  15  /  ALT  16  /  AlkPhos  65  05-25        PT/INR - ( 25 May 2017 13:44 )   PT: 19.8 sec;   INR: 1.81 ratio         PTT - ( 25 May 2017 13:44 )  PTT:39.8 sec    I&O's Summary    I & Os for current day (as of 25 May 2017 16:24)  =============================================  IN: 1318 ml / OUT: 0 ml / NET: 1318 ml    BNP  RADIOLOGY & ADDITIONAL STUDIES:
Patient is a 87y old  Female who presents with a chief complaint of abdominal pain. (22 May 2017 10:30)  HPI:  87F.  c/o abdominal pain.  left flank pain radiating to right abdomen for  1 day ago. No association with food intake, no fever at home, febrile inthe hospital now, ad nausea, no vomiting. No SOB. No urinary or bowel complaint.Was refusing surgery with dr. Zeinab yoder        PMHx:  type 2DM;  HTN;  hyperlipidemia.    PSHx:  hip sx.    ALL:  PCN-swelling of the lips.    Rx:  reviewed.    SocHx:  lives w/ son in the community.  able to ambulate w/ cane.  no tobacco.  no EtOH.    FHx:  NC. (22 May 2017 10:30)    Vital Signs Last 24 Hrs  T(C): 37.1, Max: 39 (05-24 @ 06:03)  T(F): 98.7, Max: 102.2 (05-24 @ 06:03)  HR: 84 (81 - 94)  BP: 108/58 (104/52 - 137/55)  BP(mean): --  RR: 18 (18 - 19)  SpO2: 100% (94% - 100%)    PHYSICAL EXAM:      Constitutional: NAD    General:  AOx2    Respiratory:  CTABL    Cardiovascular: S1+S2+0    Gastrointestinal : Abdomen soft, non distended,  minimal upper abdominal tenderness no rebound or guarding  Extremities: NV intact    Neurological: No focal deficit.    Labs:                          11.5   15.2  )-----------( 204      ( 24 May 2017 05:55 )             33.5       05-24    138  |  107  |  10  ----------------------------<  141<H>  4.2   |  22  |  1.06    Ca    7.8<L>      24 May 2017 05:55    TPro  6.8  /  Alb  2.8<L>  /  TBili  1.6<H>  /  DBili  x   /  AST  24  /  ALT  22  /  AlkPhos  86  05-24      PRESSION:    Gallstones and/or sludge with gallstones suggested in the gallbladder   neck. Predominantly pericholecystic inflammatory change, increased from   prior study. Findings could represent acute cholecystitis. Consider   further evaluation with nuclear medicine DISIDA scan which is more   sensitive for the detection of such diagnosis.      IMPRESSION: Abnormal morphine-augmented hepatobiliary scan.    Findings consistent with acute cholecystits.

## 2017-05-28 NOTE — PROGRESS NOTE ADULT - ASSESSMENT
87y old F:  Septic Shock - On pressors  s/p Open Ronda  HTN  DM    Plan:  Cont ICU Care  Critically Ill  Profound  metabolic acidosis  Will need urgent intubation - Check CXRAY / ABG  Septic Shock - On pressors  Keep MAP > 65  NPO / IVF  Will check CT Chest / Abd / Pelvis  Monitor renal function - Worsening  Strict I/O's  IV Abx per Meropenem  DVT prophylaxis - Hep SQ

## 2017-05-28 NOTE — PROGRESS NOTE ADULT - SUBJECTIVE AND OBJECTIVE BOX
CC: weakness    HPI:  87F.  c/o abdominal pain.  left flank which wraps arround to right side beneath diaphragm.   onset 1 day ago.  no provocative or palliative factors.  no a/w diet.    ED given MS + Zosyn + NS.  US (+) gallstone and sludge.  CT AB/pelvis (+) gallstone and sludge in gallbladder neck.  (+) inflammation.  lactate 2.6.      5/28 - Patient seen and examined. Events noted. Feels SOB. Still requiring high dose pressors.      PMHx:  type 2DM;  HTN;  hyperlipidemia.    PSHx:  hip sx.    ALL:  PCN-swelling of the lips.    Rx:  reviewed.    SocHx:  lives w/ son in the community.  able to ambulate w/ cane.  no tobacco.  no EtOH.    FHx:  NC. (22 May 2017 10:30)      PAST MEDICAL & SURGICAL HISTORY:  Hyperlipidemia  Diabetes: DM II  Hypertension      FAMILY HISTORY:      Social Hx:    Allergies    penicillins (Unknown)    Intolerances        87y        ICU Vital Signs Last 24 Hrs  T(C): 36.9, Max: 37.3 (05-27 @ 22:00)  T(F): 98.5, Max: 99.2 (05-28 @ 02:00)  HR: 84 (83 - 148)  BP: 81/35 (59/30 - 121/102)  BP(mean): 44 (38 - 106)  ABP: --  ABP(mean): --  RR: 16 (4 - 32)  SpO2: 85% (71% - 100%)          I&O's Summary    I & Os for current day (as of 28 May 2017 10:37)  =============================================  IN: 5137.4 ml / OUT: 855 ml / NET: 4282.4 ml                            10.7   12.3  )-----------( 197      ( 28 May 2017 07:04 )             33.8       05-28    139  |  114<H>  |  32<H>  ----------------------------<  107<H>  4.6   |  6<LL>  |  2.15<H>    Ca    7.3<L>      28 May 2017 07:04        CAPILLARY BLOOD GLUCOSE  85 (27 May 2017 17:27)  93 (27 May 2017 12:48)                    ABG - ( 28 May 2017 08:22 )  pH: 6.94  /  pCO2: 30    /  pO2: 92    / HCO3: 6     / Base Excess: -25   /  SaO2: 94                      MEDICATIONS  (STANDING):  docusate sodium 100milliGRAM(s) Oral three times a day  insulin lispro (HumaLOG) corrective regimen sliding scale  SubCutaneous three times a day before meals  insulin lispro (HumaLOG) corrective regimen sliding scale  SubCutaneous at bedtime  dextrose 5%. 1000milliLiter(s) IV Continuous <Continuous>  dextrose 50% Injectable 12.5Gram(s) IV Push once  dextrose 50% Injectable 25Gram(s) IV Push once  dextrose 50% Injectable 25Gram(s) IV Push once  losartan 25milliGRAM(s) Oral daily  atorvastatin 10milliGRAM(s) Oral at bedtime  metoprolol succinate ER 50milliGRAM(s) Oral daily  polyethylene glycol 3350 17Gram(s) Oral daily  pantoprazole    Tablet 40milliGRAM(s) Oral before breakfast  multivitamin 1Tablet(s) Oral daily  heparin  Injectable 5000Unit(s) SubCutaneous every 8 hours  phenylephrine    Infusion 0.5MICROgram(s)/kG/Min IV Continuous <Continuous>  sodium chloride 0.9%. 1000milliLiter(s) IV Continuous <Continuous>  meropenem IVPB  IV Intermittent   meropenem IVPB 500milliGRAM(s) IV Intermittent every 8 hours  ALBUTerol/ipratropium for Nebulization 3milliLiter(s) Nebulizer two times a day  amiodarone Infusion 1mG/Min IV Continuous <Continuous>  propofol Infusion 5MICROgram(s)/kG/Min IV Continuous <Continuous>  chlorhexidine 0.12% Liquid 15milliLiter(s) Swish and Spit two times a day  sodium chloride 0.9% Bolus 500milliLiter(s) IV Bolus once  sodium chloride 0.9%. 1000milliLiter(s) IV Continuous <Continuous>    MEDICATIONS  (PRN):  acetaminophen   Tablet 650milliGRAM(s) Oral every 6 hours PRN For Temp greater than 38.5 C (101.3 F)  acetaminophen   Tablet. 650milliGRAM(s) Oral every 6 hours PRN Mild Pain (1 - 3)  dextrose Gel 1Dose(s) Oral once PRN Blood Glucose LESS THAN 70 milliGRAM(s)/deciliter  glucagon  Injectable 1milliGRAM(s) IntraMuscular once PRN Glucose LESS THAN 70 milligrams/deciliter  HYDROmorphone  Injectable 1milliGRAM(s) IV Push every 4 hours PRN Severe Pain  oxyCODONE IR 5milliGRAM(s) Oral every 6 hours PRN Moderate Pain          DVT Prophylaxis: Hep SQ    Advanced Directives:  Discussed with:    Visit Information:    45-min CC Time is exclusive of billed procedures and/or teaching and/or routine family updates.

## 2017-05-28 NOTE — PROGRESS NOTE ADULT - SUBJECTIVE AND OBJECTIVE BOX
called by RN - pt with abrupt onset of tachycardia    seen and examined - No CP, No SOB    -150/min    STAT EKG reviewed - atrial fibrillation with RVR    cannot take B-Blocker/Ca Ch Blocker due to hypotension requiring IV vasopressor.    Will start amiodarone IV bolus and infusion now.

## 2017-05-28 NOTE — PROGRESS NOTE ADULT - ASSESSMENT
86 y/o Female with h/o DM type 2, HL, HTN was admitted on 5/22 for worsening abdominal pain. She has left flank pain which wraps around to right side beneath diaphragm. In ED she received Zosyn.     1. Acute respiratory failure. Sepsis with ESBL E.coli and EN avium. Acute gallstone cholecystitis s/p surgery with mixed GNR and enterococcus spp. ARF. Allergy to PCN.  -Hypotension. Metabolic acidosis.  -low grade fever  -leukocytosis   -s/p cipro 400 mg IV q12h s/p metronidazole 500 mg IV q8h # 5  -on meropenem IV # 3  -tolerating abx well so far; no side effects noted   -decrease meropenem dose to 500 mg IV q12h due worsening renal function  -monitor closely due to PCN allergy history  -continue abx coverage  -f/u repeat BC x 2  -prognosis is poor  -monitor temps  -f/u CBC  -supportive care  2. Other issues: DM type 2, HL, HTN  -care per medicine

## 2017-05-28 NOTE — PROCEDURE NOTE - PROCEDURE
Dialysis catheter inserted in groin  05/28/2017  catheter placed in RIJ  Active  PEPITOATRMICHAEL
Intubation, endotracheal  05/28/2017    Active  MARIANA

## 2017-05-28 NOTE — PROCEDURE NOTE - NSPOSTCAREGUIDE_GEN_A_CORE
Care for catheter as per unit/ICU protocols/Verbal/written post procedure instructions were given to patient/caregiver

## 2017-05-28 NOTE — PROGRESS NOTE ADULT - ASSESSMENT
A/P:  S/P open cholecystectomy for gangrenous cholecystitis  Septic shock on pressor support  Respiratory failure on mechanical ventilation  Renal failure  Nephrology on consult  Critical care management per Intensivist  GI/DVT prophylaxis  Pain control  Serial abd exams  Drain care  F/U labs, radiologic studies  Cont current care and meds  ID on consult, antibiotics per ID

## 2017-05-28 NOTE — PROGRESS NOTE ADULT - SUBJECTIVE AND OBJECTIVE BOX
Time out performed W Dialysis nurse Eliel Downs.  Pt identified.  Started on HD  cvc working well.  Good abf and pressures  BP stable

## 2017-05-28 NOTE — CONSULT NOTE ADULT - ASSESSMENT
86 y/o Female with h/o DM type 2, HL, HTN was admitted on 5/22 for worsening abdominal pain. She has left flank pain which wraps around to right side beneath diaphragm. In ED she received Zosyn.     1. Acute gallstone cholecystitis. Allergy to PCN.  -obtain BC x 2  -continue cipro 400 mg IV q12h and metronidazole 500 mg IV q8h  -reason for abx use and side effects reviewed with patient; monitor BMP   -monitor temps  -f/u CBC  -supportive care  2. Other issues: DM type 2, HL, HTN  -care per medicine
Cholecystitis. Have recommended lap choley to patient through  phone. Patient claims she wants to see her other doctor from [facility] and does not want surgery at moment.
87 Y old female with acute cholecystitis, spiking fever, elevate WBC count, initially refusing surgery, now wants o talk to GI before agreeing to surgical intervention,    IVF  IV antibiotics  DVt prophylaxis  GI followup  Trend LFT  Need medical optimization clearance before surgical intervention  if quickly start worsening may need IR intervention, keep NPO PMN.  Will D/W son
87F.  c/o abdominal pain.  left flank which wraps arround to right side beneath diaphragm.   onset 1 day ago.  no provocative or palliative factors.  no a/w diet.    ED given MS + Zosyn + NS.  US (+) gallstone and sludge.  CT AB/pelvis (+) gallstone and sludge in gallbladder neck.  (+) inflammation.  lactate 2.6.    Pt developing vidya, metabolic acidosis, lactate improved.  pH 6.94, base excess -25  spoke to pts son he is agreeable to HD  D/w Surgery intensivist  pt will be started on hd today , will need temp catheter
Acute Cholecystitis-Conformed with HIDA
Preop-  THIS IS AN EMERGENT SURGERY PER PRIMARY TEAM.  Pt was noted to have normal troponins during the hospital course despite some EKG changes noted which could be nonspecific.  Her echo from 2015 in [facility] showed normal LVEF with no significant abnormalities.  Pt can proceed with surgery given the urgency.     Close monitoring of the volume status is recommended periop.  Periop betablockers swapna continued only if BP allows.    Thank you for allowing me to participate in the care of this patient. Please feel free to contact me with any questions.
A/P 87 female S/P open jo-ann and with post op hypotension.      Plan:  ICU    PULM: Continue NCO2    Cardio: INÉS started for BP support.  Patient is likely in hypovolemic shock as she has had a decreased H & H.      Renal: Poor UO.  Continue IVF after the second U PRBC.  pre Op she had a rising Cr.  [place] fo I & O in a post op hypotensive patient    ID: Will continue Aztreonam.  Added Flagyl for anaerobic coverage andd 1 dose Vanco.  ID to see in AM    Surg: ROBERT with 80 cc since arrival today-60 when she just came into the ICU and then 20 after>  Called Surgery to make aware.      Will senc Chem 7 and CBC post transfusion    Mina spent 45 min

## 2017-05-28 NOTE — PROCEDURE NOTE - NSTRACHPOSTINTU_RESP_A_CORE
Appropriate capnography/Positive end tidal Co2 noted/Breath sounds equal/Breath sounds bilateral/Chest excursion noted/Chest X-Ray

## 2017-05-28 NOTE — PROGRESS NOTE ADULT - SUBJECTIVE AND OBJECTIVE BOX
Patient is a 87y old  Female who presents with a chief complaint of abdominal pain. (22 May 2017 10:30)    HPI:  86 y/o Female with h/o DM type 2, HL, HTN was admitted on  for worsening abdominal pain. She has left flank pain which wraps around to right side beneath diaphragm. In ED she received Zosyn.  US (+) gallstone and sludge.  CT AB/pelvis (+) gallstone and sludge in gallbladder neck.  (+) inflammation.  lactate 2.6.      Events noted: intubated and placed on ventilatory support  Remains hypotensive on pressors  Sedated  Drain in place    MEDICATIONS  (STANDING):  docusate sodium 100milliGRAM(s) Oral three times a day  insulin lispro (HumaLOG) corrective regimen sliding scale  SubCutaneous three times a day before meals  insulin lispro (HumaLOG) corrective regimen sliding scale  SubCutaneous at bedtime  dextrose 5%. 1000milliLiter(s) IV Continuous <Continuous>  dextrose 50% Injectable 12.5Gram(s) IV Push once  dextrose 50% Injectable 25Gram(s) IV Push once  dextrose 50% Injectable 25Gram(s) IV Push once  atorvastatin 10milliGRAM(s) Oral at bedtime  polyethylene glycol 3350 17Gram(s) Oral daily  pantoprazole    Tablet 40milliGRAM(s) Oral before breakfast  multivitamin 1Tablet(s) Oral daily  heparin  Injectable 5000Unit(s) SubCutaneous every 8 hours  phenylephrine    Infusion 0.5MICROgram(s)/kG/Min IV Continuous <Continuous>  sodium chloride 0.9%. 1000milliLiter(s) IV Continuous <Continuous>  meropenem IVPB  IV Intermittent   meropenem IVPB 500milliGRAM(s) IV Intermittent every 8 hours  ALBUTerol/ipratropium for Nebulization 3milliLiter(s) Nebulizer two times a day  amiodarone Infusion 1mG/Min IV Continuous <Continuous>  propofol Infusion 5MICROgram(s)/kG/Min IV Continuous <Continuous>  chlorhexidine 0.12% Liquid 15milliLiter(s) Swish and Spit two times a day  sodium chloride 0.9% Bolus 500milliLiter(s) IV Bolus once  sodium chloride 0.9%. 1000milliLiter(s) IV Continuous <Continuous>    MEDICATIONS  (PRN):  acetaminophen   Tablet 650milliGRAM(s) Oral every 6 hours PRN For Temp greater than 38.5 C (101.3 F)  acetaminophen   Tablet. 650milliGRAM(s) Oral every 6 hours PRN Mild Pain (1 - 3)  dextrose Gel 1Dose(s) Oral once PRN Blood Glucose LESS THAN 70 milliGRAM(s)/deciliter  glucagon  Injectable 1milliGRAM(s) IntraMuscular once PRN Glucose LESS THAN 70 milligrams/deciliter  HYDROmorphone  Injectable 1milliGRAM(s) IV Push every 4 hours PRN Severe Pain      Vital Signs Last 24 Hrs  T(C): 36.9, Max: 37.3 ( @ 22:00)  T(F): 98.5, Max: 99.2 ( @ 02:00)  HR: 84 (83 - 148)  BP: 81/35 (59/30 - 121/102)  BP(mean): 44 (38 - 106)  RR: 16 (4 - 32)  SpO2: 85% (71% - 100%)    Physical Exam:    Constitutional: frail looking  HEENT: NC/AT, EOMI, PERRLA  Neck: supple  Back: no tenderness  Respiratory: b/l rhonchi  Cardiovascular: S1S2 regular, no murmurs  Abdomen: soft, RUQ abdomen tender, mild distended, positive BS; drain in place; postoperative wound is clean  Genitourinary: deferred  Musculoskeletal: no muscle tenderness, no joint swelling or tenderness  Extremities: no pedal edema  Neurological: AxOx3, moving all extremities, no focal deficits  Skin: no rashes    Labs:                        10.7   12.3  )-----------( 197      ( 28 May 2017 07:04 )             33.8         139  |  114<H>  |  32<H>  ----------------------------<  107<H>  4.6   |  6<LL>  |  2.15<H>    Ca    7.3<L>      28 May 2017 07:04               10.5   12.3  )-----------( 167      ( 27 May 2017 09:34 )             31.8         140  |  116<H>  |  28<H>  ----------------------------<  90  4.0   |  10<LL>  |  1.86<H>    Ca    6.7<L>      27 May 2017 07:28    TPro  6.3  /  Alb  2.3<L>  /  TBili  1.1  /  DBili  x   /  AST  67<H>  /  ALT  26  /  AlkPhos  79                 11.1   18.8  )-----------( 212      ( 26 May 2017 04:48 )             32.6     -    134<L>  |  109<H>  |  23  ----------------------------<  174<H>  4.3   |  18<L>  |  2.25<H>    Ca    6.4<LL>      26 May 2017 04:48    TPro  6.3  /  Alb  2.3<L>  /  TBili  1.1  /  DBili  x   /  AST  67<H>  /  ALT  26  /  AlkPhos  79             Cultures:              11.5   15.2  )-----------( 204      ( 24 May 2017 05:55 )             33.5     05-24    138  |  107  |  10  ----------------------------<  141<H>  4.2   |  22  |  1.06    Ca    7.8<L>      24 May 2017 05:55    TPro  6.8  /  Alb  2.8<L>  /  TBili  1.6<H>  /  DBili  x   /  AST  24  /  ALT  22  /  AlkPhos  86             Cultures:              10.6   5.6   )-----------( 209      ( 23 May 2017 06:23 )             33.4     05-23    143  |  111<H>  |  11  ----------------------------<  129<H>  5.0   |  23  |  0.99    Ca    8.1<L>      23 May 2017 06:23  Mg     1.7     05-21    TPro  6.8  /  Alb  2.8<L>  /  TBili  1.1  /  DBili  0.2  /  AST  18  /  ALT  22  /  AlkPhos  83  05               12.3   7.7   )-----------( 272      ( 21 May 2017 22:34 )             38.0         138  |  108  |  20  ----------------------------<  143<H>  5.2   |  24  |  1.34<H>    Ca    8.3<L>      21 May 2017 22:34  Mg     1.7         TPro  8.2  /  Alb  3.5  /  TBili  0.6  /  DBili  x   /  AST  26  /  ALT  26  /  AlkPhos  117       LIVER FUNCTIONS - ( 21 May 2017 22:34 )  Alb: 3.5 g/dL / Pro: 8.2 gm/dL / ALK PHOS: 117 U/L / ALT: 26 U/L / AST: 26 U/L / GGT: x           Urinalysis Basic - ( 22 May 2017 02:04 )    Color: Yellow / Appearance: Clear / S.010 / pH: x  Gluc: x / Ketone: Negative  / Bili: Negative / Urobili: Negative mg/dL   Blood: x / Protein: Negative mg/dL / Nitrite: Negative   Leuk Esterase: Negative / RBC: 3-5 /HPF / WBC 0-2   Sq Epi: x / Non Sq Epi: Negative / Bacteria: Occasional    Culture - Surgical Swab (17 @ 17:00)    Specimen Source: .Surgical Swab gall bladder    Culture Results:   Numerous Mixed gram negative rods  Numerous Enterococcus species    Culture - Blood (17 @ 13:56)    -  Ampicillin/Sulbactam: R <=8/4    -  Cefepime: R >16    -  Ceftriaxone: R >32    -  Ciprofloxacin: R >2    -  Gentamicin: S <=4    -  Imipenem: S <=1    -  Piperacillin/Tazobactam: R <=16    -  Amikacin: S <=16    -  Ampicillin: R >16    -  Cefoxitin: S <=8    -  Ceftazidime: R 16    Gram Stain:   Growth in aerobic bottle: Gram Negative Rods  Growth in anaerobic bottle: Gram Positive Cocci in Pairs and Chains    -  Ertapenem: S <=1    -  Trimethoprim/Sulfamethoxazole: R >2/38    -  Aztreonam: R >16    -  Cefazolin: R >16    -  Levofloxacin: R >4    -  Meropenem: S <=1    -  Tobramycin: S <=4    Specimen Source: .Blood None    Organism: Escherichia coli ESBL    Culture Results:   Growth in aerobic bottle:  Escherichia coli  Growth in anaerobic bottle:  Enterococcus faecalis and Enterococcus avium    Organism Identification: Escherichia coli ESBL    Method Type: SLIME        Radiology:    CT abdomen and pelvis:   Gallstones and/or sludge with gallstones suggested in the gallbladder   neck. Predominantly pericholecystic inflammatory change, increased from   prior study. Findings could represent acute cholecystitis. Consider   further evaluation with nuclear medicine DISIDA scan which is more   sensitive for the detection of such diagnosis.    HIDA:  Abnormal morphine-augmented hepatobiliary scan.  Findings consistent with acute cholecystitis.    Advanced directives addressed: full resuscitation

## 2017-05-29 LAB
AMYLASE P1 CFR SERPL: 350 U/L — HIGH (ref 25–115)
ANION GAP SERPL CALC-SCNC: 11 MMOL/L — SIGNIFICANT CHANGE UP (ref 5–17)
BUN SERPL-MCNC: 25 MG/DL — HIGH (ref 7–23)
CA-I BLD-SCNC: 0.94 MMOL/L — LOW (ref 1.05–1.34)
CALCIUM SERPL-MCNC: 6.4 MG/DL — CRITICAL LOW (ref 8.5–10.1)
CHLORIDE SERPL-SCNC: 107 MMOL/L — SIGNIFICANT CHANGE UP (ref 96–108)
CO2 SERPL-SCNC: 22 MMOL/L — SIGNIFICANT CHANGE UP (ref 22–31)
CREAT SERPL-MCNC: 1.74 MG/DL — HIGH (ref 0.5–1.3)
CULTURE RESULTS: SIGNIFICANT CHANGE UP
CULTURE RESULTS: SIGNIFICANT CHANGE UP
GLUCOSE SERPL-MCNC: 208 MG/DL — HIGH (ref 70–99)
HAV IGM SER-ACNC: SIGNIFICANT CHANGE UP
HBV CORE IGM SER-ACNC: SIGNIFICANT CHANGE UP
HBV SURFACE AG SER-ACNC: SIGNIFICANT CHANGE UP
HCT VFR BLD CALC: 27.2 % — LOW (ref 34.5–45)
HCV AB S/CO SERPL IA: 0.15 S/CO — SIGNIFICANT CHANGE UP
HCV AB SERPL-IMP: SIGNIFICANT CHANGE UP
HGB BLD-MCNC: 9.1 G/DL — LOW (ref 11.5–15.5)
LACTATE SERPL-SCNC: 1.9 MMOL/L — SIGNIFICANT CHANGE UP (ref 0.7–2)
LACTATE SERPL-SCNC: 2.8 MMOL/L — HIGH (ref 0.7–2)
LIDOCAIN IGE QN: 282 U/L — SIGNIFICANT CHANGE UP (ref 73–393)
MCHC RBC-ENTMCNC: 31.5 PG — SIGNIFICANT CHANGE UP (ref 27–34)
MCHC RBC-ENTMCNC: 33.4 GM/DL — SIGNIFICANT CHANGE UP (ref 32–36)
MCV RBC AUTO: 94.4 FL — SIGNIFICANT CHANGE UP (ref 80–100)
PLATELET # BLD AUTO: 134 K/UL — LOW (ref 150–400)
POTASSIUM SERPL-MCNC: 2.6 MMOL/L — CRITICAL LOW (ref 3.5–5.3)
POTASSIUM SERPL-SCNC: 2.6 MMOL/L — CRITICAL LOW (ref 3.5–5.3)
RBC # BLD: 2.88 M/UL — LOW (ref 3.8–5.2)
RBC # FLD: 16 % — HIGH (ref 10.3–14.5)
SODIUM SERPL-SCNC: 140 MMOL/L — SIGNIFICANT CHANGE UP (ref 135–145)
SPECIMEN SOURCE: SIGNIFICANT CHANGE UP
SPECIMEN SOURCE: SIGNIFICANT CHANGE UP
WBC # BLD: 8.3 K/UL — SIGNIFICANT CHANGE UP (ref 3.8–10.5)
WBC # FLD AUTO: 8.3 K/UL — SIGNIFICANT CHANGE UP (ref 3.8–10.5)

## 2017-05-29 PROCEDURE — 71250 CT THORAX DX C-: CPT | Mod: 26

## 2017-05-29 PROCEDURE — 74176 CT ABD & PELVIS W/O CONTRAST: CPT | Mod: 26

## 2017-05-29 RX ORDER — ALBUMIN HUMAN 25 %
100 VIAL (ML) INTRAVENOUS
Qty: 0 | Refills: 0 | Status: COMPLETED | OUTPATIENT
Start: 2017-05-29 | End: 2017-05-29

## 2017-05-29 RX ORDER — POTASSIUM CHLORIDE 20 MEQ
10 PACKET (EA) ORAL
Qty: 0 | Refills: 0 | Status: COMPLETED | OUTPATIENT
Start: 2017-05-29 | End: 2017-05-29

## 2017-05-29 RX ADMIN — HEPARIN SODIUM 5000 UNIT(S): 5000 INJECTION INTRAVENOUS; SUBCUTANEOUS at 05:47

## 2017-05-29 RX ADMIN — HEPARIN SODIUM 5000 UNIT(S): 5000 INJECTION INTRAVENOUS; SUBCUTANEOUS at 13:46

## 2017-05-29 RX ADMIN — Medication 1 TABLET(S): at 11:45

## 2017-05-29 RX ADMIN — POLYETHYLENE GLYCOL 3350 17 GRAM(S): 17 POWDER, FOR SOLUTION ORAL at 11:46

## 2017-05-29 RX ADMIN — PHENYLEPHRINE HYDROCHLORIDE 15.3 MICROGRAM(S)/KG/MIN: 10 INJECTION INTRAVENOUS at 22:45

## 2017-05-29 RX ADMIN — MEROPENEM 200 MILLIGRAM(S): 1 INJECTION INTRAVENOUS at 05:48

## 2017-05-29 RX ADMIN — CHLORHEXIDINE GLUCONATE 5 MILLILITER(S): 213 SOLUTION TOPICAL at 05:48

## 2017-05-29 RX ADMIN — PANTOPRAZOLE SODIUM 40 MILLIGRAM(S): 20 TABLET, DELAYED RELEASE ORAL at 11:46

## 2017-05-29 RX ADMIN — Medication 76.5 MICROGRAM(S)/KG/MIN: at 00:14

## 2017-05-29 RX ADMIN — PHENYLEPHRINE HYDROCHLORIDE 15.3 MICROGRAM(S)/KG/MIN: 10 INJECTION INTRAVENOUS at 17:30

## 2017-05-29 RX ADMIN — Medication 100 MILLIEQUIVALENT(S): at 09:41

## 2017-05-29 RX ADMIN — Medication 150 MEQ/KG/HR: at 09:34

## 2017-05-29 RX ADMIN — HYDROMORPHONE HYDROCHLORIDE 1 MILLIGRAM(S): 2 INJECTION INTRAMUSCULAR; INTRAVENOUS; SUBCUTANEOUS at 13:47

## 2017-05-29 RX ADMIN — PROPOFOL 2.45 MICROGRAM(S)/KG/MIN: 10 INJECTION, EMULSION INTRAVENOUS at 16:42

## 2017-05-29 RX ADMIN — HYDROMORPHONE HYDROCHLORIDE 1 MILLIGRAM(S): 2 INJECTION INTRAMUSCULAR; INTRAVENOUS; SUBCUTANEOUS at 14:17

## 2017-05-29 RX ADMIN — PHENYLEPHRINE HYDROCHLORIDE 15.3 MICROGRAM(S)/KG/MIN: 10 INJECTION INTRAVENOUS at 05:47

## 2017-05-29 RX ADMIN — CHLORHEXIDINE GLUCONATE 5 MILLILITER(S): 213 SOLUTION TOPICAL at 17:28

## 2017-05-29 RX ADMIN — Medication 100 MILLILITER(S): at 11:03

## 2017-05-29 RX ADMIN — Medication 100 MILLILITER(S): at 12:13

## 2017-05-29 RX ADMIN — ATORVASTATIN CALCIUM 10 MILLIGRAM(S): 80 TABLET, FILM COATED ORAL at 22:45

## 2017-05-29 RX ADMIN — PHENYLEPHRINE HYDROCHLORIDE 15.3 MICROGRAM(S)/KG/MIN: 10 INJECTION INTRAVENOUS at 00:14

## 2017-05-29 RX ADMIN — HEPARIN SODIUM 5000 UNIT(S): 5000 INJECTION INTRAVENOUS; SUBCUTANEOUS at 22:45

## 2017-05-29 RX ADMIN — MEROPENEM 200 MILLIGRAM(S): 1 INJECTION INTRAVENOUS at 17:28

## 2017-05-29 RX ADMIN — Medication 76.5 MICROGRAM(S)/KG/MIN: at 17:28

## 2017-05-29 RX ADMIN — PHENYLEPHRINE HYDROCHLORIDE 15.3 MICROGRAM(S)/KG/MIN: 10 INJECTION INTRAVENOUS at 11:45

## 2017-05-29 NOTE — PROGRESS NOTE ADULT - SUBJECTIVE AND OBJECTIVE BOX
Patient is a 87y old  Female who presents with a chief complaint of abdominal pain. (22 May 2017 10:30)    HPI:  88 y/o Female with h/o DM type 2, HL, HTN was admitted on  for worsening abdominal pain. She has left flank pain which wraps around to right side beneath diaphragm. In ED she received Zosyn.  US (+) gallstone and sludge.  CT AB/pelvis (+) gallstone and sludge in gallbladder neck.  (+) inflammation.  lactate 2.6.      Remains intubated and placed on ventilatory support  Remains hypotensive on pressors  On HD  Drain in place    MEDICATIONS  (STANDING):  docusate sodium 100milliGRAM(s) Oral three times a day  dextrose 5%. 1000milliLiter(s) IV Continuous <Continuous>  dextrose 50% Injectable 12.5Gram(s) IV Push once  dextrose 50% Injectable 25Gram(s) IV Push once  dextrose 50% Injectable 25Gram(s) IV Push once  atorvastatin 10milliGRAM(s) Oral at bedtime  polyethylene glycol 3350 17Gram(s) Oral daily  multivitamin 1Tablet(s) Oral daily  heparin  Injectable 5000Unit(s) SubCutaneous every 8 hours  phenylephrine    Infusion 0.5MICROgram(s)/kG/Min IV Continuous <Continuous>  ALBUTerol/ipratropium for Nebulization 3milliLiter(s) Nebulizer two times a day  amiodarone Infusion 1mG/Min IV Continuous <Continuous>  propofol Infusion 5MICROgram(s)/kG/Min IV Continuous <Continuous>  meropenem IVPB 500milliGRAM(s) IV Intermittent every 12 hours  norepinephrine Infusion 0.5MICROgram(s)/kG/Min IV Continuous <Continuous>  pantoprazole  Injectable 40milliGRAM(s) IV Push daily  chlorhexidine 0.12% Liquid 5milliLiter(s) Swish and Spit two times a day  albumin human 25% IVPB 100milliLiter(s) IV Intermittent every 1 hour    MEDICATIONS  (PRN):  acetaminophen   Tablet 650milliGRAM(s) Oral every 6 hours PRN For Temp greater than 38.5 C (101.3 F)  acetaminophen   Tablet. 650milliGRAM(s) Oral every 6 hours PRN Mild Pain (1 - 3)  dextrose Gel 1Dose(s) Oral once PRN Blood Glucose LESS THAN 70 milliGRAM(s)/deciliter  glucagon  Injectable 1milliGRAM(s) IntraMuscular once PRN Glucose LESS THAN 70 milligrams/deciliter  HYDROmorphone  Injectable 1milliGRAM(s) IV Push every 4 hours PRN Severe Pain      Vital Signs Last 24 Hrs  T(C): 36.8, Max: 37.1 ( @ 18:02)  T(F): 98.2, Max: 98.8 ( @ 18:02)  HR: 93 (87 - 120)  BP: 120/46 (76/43 - 154/131)  BP(mean): 64 (41 - 137)  RR: 16 (0 - 23)  SpO2: 100% (89% - 100%)    Physical Exam:    Constitutional: frail looking  HEENT: NC/AT, EOMI, PERRLA  Neck: supple  Back: no tenderness  Respiratory: b/l rhonchi  Cardiovascular: S1S2 regular, no murmurs  Abdomen: soft, RUQ abdomen tender, mild distended, positive BS; drain in place; postoperative wound is clean  Genitourinary: deferred  Musculoskeletal: no muscle tenderness, no joint swelling or tenderness  Extremities: no pedal edema  Neurological: confused, moving all extremities, no focal deficits  Skin: no rashes    Labs:                        9.1    8.3   )-----------( 134      ( 29 May 2017 07:20 )             27.2         140  |  107  |  25<H>  ----------------------------<  208<H>  2.6<LL>   |  22  |  1.74<H>    Ca    6.4<LL>      29 May 2017 07:20               10.7   12.3  )-----------( 197      ( 28 May 2017 07:04 )             33.8     -    139  |  114<H>  |  32<H>  ----------------------------<  107<H>  4.6   |  6<LL>  |  2.15<H>    Ca    7.3<L>      28 May 2017 07:04               10.5   12.3  )-----------( 167      ( 27 May 2017 09:34 )             31.8     05    140  |  116<H>  |  28<H>  ----------------------------<  90  4.0   |  10<LL>  |  1.86<H>    Ca    6.7<L>      27 May 2017 07:28    TPro  6.3  /  Alb  2.3<L>  /  TBili  1.1  /  DBili  x   /  AST  67<H>  /  ALT  26  /  AlkPhos  79                 11.1   18.8  )-----------( 212      ( 26 May 2017 04:48 )             32.6     05-26    134<L>  |  109<H>  |  23  ----------------------------<  174<H>  4.3   |  18<L>  |  2.25<H>    Ca    6.4<LL>      26 May 2017 04:48    TPro  6.3  /  Alb  2.3<L>  /  TBili  1.1  /  DBili  x   /  AST  67<H>  /  ALT  26  /  AlkPhos  79             Cultures:              11.5   15.2  )-----------( 204      ( 24 May 2017 05:55 )             33.5     05-24    138  |  107  |  10  ----------------------------<  141<H>  4.2   |  22  |  1.06    Ca    7.8<L>      24 May 2017 05:55    TPro  6.8  /  Alb  2.8<L>  /  TBili  1.6<H>  /  DBili  x   /  AST  24  /  ALT  22  /  AlkPhos  86             Cultures:              10.6   5.6   )-----------( 209      ( 23 May 2017 06:23 )             33.4     05-23    143  |  111<H>  |  11  ----------------------------<  129<H>  5.0   |  23  |  0.99    Ca    8.1<L>      23 May 2017 06:23  Mg     1.7     05-21    TPro  6.8  /  Alb  2.8<L>  /  TBili  1.1  /  DBili  0.2  /  AST  18  /  ALT  22  /  AlkPhos  83                 12.3   7.7   )-----------( 272      ( 21 May 2017 22:34 )             38.0         138  |  108  |  20  ----------------------------<  143<H>  5.2   |  24  |  1.34<H>    Ca    8.3<L>      21 May 2017 22:34  Mg     1.7         TPro  8.2  /  Alb  3.5  /  TBili  0.6  /  DBili  x   /  AST  26  /  ALT  26  /  AlkPhos  117       LIVER FUNCTIONS - ( 21 May 2017 22:34 )  Alb: 3.5 g/dL / Pro: 8.2 gm/dL / ALK PHOS: 117 U/L / ALT: 26 U/L / AST: 26 U/L / GGT: x           Urinalysis Basic - ( 22 May 2017 02:04 )    Color: Yellow / Appearance: Clear / S.010 / pH: x  Gluc: x / Ketone: Negative  / Bili: Negative / Urobili: Negative mg/dL   Blood: x / Protein: Negative mg/dL / Nitrite: Negative   Leuk Esterase: Negative / RBC: 3-5 /HPF / WBC 0-2   Sq Epi: x / Non Sq Epi: Negative / Bacteria: Occasional    Culture - Surgical Swab (17 @ 17:00)    Specimen Source: .Surgical Swab gall bladder    Culture Results:   Numerous Mixed gram negative rods  Numerous Enterococcus species    Culture - Blood (17 @ 13:56)    -  Ampicillin/Sulbactam: R <=8/4    -  Cefepime: R >16    -  Ceftriaxone: R >32    -  Ciprofloxacin: R >2    -  Gentamicin: S <=4    -  Imipenem: S <=1    -  Piperacillin/Tazobactam: R <=16    -  Amikacin: S <=16    -  Ampicillin: R >16    -  Cefoxitin: S <=8    -  Ceftazidime: R 16    Gram Stain:   Growth in aerobic bottle: Gram Negative Rods  Growth in anaerobic bottle: Gram Positive Cocci in Pairs and Chains    -  Ertapenem: S <=1    -  Trimethoprim/Sulfamethoxazole: R >2/38    -  Aztreonam: R >16    -  Cefazolin: R >16    -  Levofloxacin: R >4    -  Meropenem: S <=1    -  Tobramycin: S <=4    Specimen Source: .Blood None    Organism: Escherichia coli ESBL    Culture Results:   Growth in aerobic bottle:  Escherichia coli  Growth in anaerobic bottle:  Enterococcus faecalis and Enterococcus avium    Organism Identification: Escherichia coli ESBL    Method Type: SLIME    Culture - Surgical Swab (17 @ 17:00)    -  Ampicillin: S <=2    -  Ampicillin: S <=2    -  Tetra/Doxy: S <=4    -  Vancomycin: S 2    -  Erythromycin: I 1    -  Erythromycin: S <=0.5    -  Tetra/Doxy: R >8    -  Ciprofloxacin: S <=1    -  Ciprofloxacin: S <=1    -  Vancomycin: S 0.5    Specimen Source: .Surgical Swab gall bladder    Culture Results:   Numerous Mixed gram negative rods  Numerous Enterococcus faecalis  Numerous Enterococcus avium    Organism Identification: Enterococcus faecalis  Enterococcus avium    Organism: Enterococcus avium    Organism: Enterococcus faecalis    Method Type: SLIME    Method Type: SLIME        Radiology:    CT abdomen and pelvis:   Gallstones and/or sludge with gallstones suggested in the gallbladder   neck. Predominantly pericholecystic inflammatory change, increased from   prior study. Findings could represent acute cholecystitis. Consider   further evaluation with nuclear medicine DISIDA scan which is more   sensitive for the detection of such diagnosis.    HIDA:  Abnormal morphine-augmented hepatobiliary scan.  Findings consistent with acute cholecystitis.    Advanced directives addressed: full resuscitation

## 2017-05-29 NOTE — PROGRESS NOTE ADULT - ASSESSMENT
87 Y old female  S/p open cholecystectomy POD 4 in sepsis, renal failure, Abdominal wall cellulitis    Serial abdominal exam  Wean pressors as tolerated' Trend labs  Follow up cultures, Growing ESBL  ID following continue antibiotics  Medicine , nephrology following  Monitor abdominal wall swelling  ICU are appreciated.

## 2017-05-29 NOTE — PROGRESS NOTE ADULT - SUBJECTIVE AND OBJECTIVE BOX
CC: On Vent    HPI:  87F.  c/o abdominal pain.  left flank which wraps arround to right side beneath diaphragm.   onset 1 day ago.  no provocative or palliative factors.  no a/w diet.    ED given MS + Zosyn + NS.  US (+) gallstone and sludge.  CT AB/pelvis (+) gallstone and sludge in gallbladder neck.  (+) inflammation.  lactate 2.6.      5/29 - Events noted. Remains on the vent. Pressors for BP support. Had 1st HD yesterday.    PMHx:  type 2DM;  HTN;  hyperlipidemia.  PSHx:  hip sx.  ALL:  PCN-swelling of the lips.  Rx:  reviewed.    SocHx:  lives w/ son in the community.  able to ambulate w/ cane.  no tobacco.  no EtOH.    FHx:  NC. (22 May 2017 10:30)      PAST MEDICAL & SURGICAL HISTORY:  Hyperlipidemia  Diabetes: DM II  Hypertension      FAMILY HISTORY:      Social Hx:    Allergies    penicillins (Unknown)    Intolerances        87y        ICU Vital Signs Last 24 Hrs  T(C): 36.3, Max: 37.1 (05-28 @ 18:02)  T(F): 97.4, Max: 98.8 (05-28 @ 18:02)  HR: 100 (82 - 120)  BP: 101/64 (76/43 - 154/131)  BP(mean): 71 (41 - 137)  ABP: --  ABP(mean): --  RR: 13 (0 - 23)  SpO2: 99% (81% - 100%)      Mode: AC/ CMV (Assist Control/ Continuous Mandatory Ventilation)  RR (machine): 12  TV (machine): 550  FiO2: 40  PEEP: 5  PS: 50  ITime: 1  PIP: 44      I&O's Summary  I & Os for 24h ending 29 May 2017 07:00  =============================================  IN: 58078.4 ml / OUT: 1320 ml / NET: 9081.4 ml    I & Os for current day (as of 29 May 2017 10:27)  =============================================  IN: 546.4 ml / OUT: 40 ml / NET: 506.4 ml                            9.1    8.3   )-----------( 134      ( 29 May 2017 07:20 )             27.2       05-29    140  |  107  |  25<H>  ----------------------------<  208<H>  2.6<LL>   |  22  |  1.74<H>    Ca    6.4<LL>      29 May 2017 07:20        CAPILLARY BLOOD GLUCOSE                    ABG - ( 28 May 2017 11:34 )  pH: 6.97  /  pCO2: 23    /  pO2: 348   / HCO3: 5     / Base Excess: -26   /  SaO2: 99                      MEDICATIONS  (STANDING):  docusate sodium 100milliGRAM(s) Oral three times a day  dextrose 5%. 1000milliLiter(s) IV Continuous <Continuous>  dextrose 50% Injectable 12.5Gram(s) IV Push once  dextrose 50% Injectable 25Gram(s) IV Push once  dextrose 50% Injectable 25Gram(s) IV Push once  atorvastatin 10milliGRAM(s) Oral at bedtime  polyethylene glycol 3350 17Gram(s) Oral daily  multivitamin 1Tablet(s) Oral daily  heparin  Injectable 5000Unit(s) SubCutaneous every 8 hours  phenylephrine    Infusion 0.5MICROgram(s)/kG/Min IV Continuous <Continuous>  ALBUTerol/ipratropium for Nebulization 3milliLiter(s) Nebulizer two times a day  amiodarone Infusion 1mG/Min IV Continuous <Continuous>  propofol Infusion 5MICROgram(s)/kG/Min IV Continuous <Continuous>  meropenem IVPB 500milliGRAM(s) IV Intermittent every 12 hours  norepinephrine Infusion 0.5MICROgram(s)/kG/Min IV Continuous <Continuous>  pantoprazole  Injectable 40milliGRAM(s) IV Push daily  chlorhexidine 0.12% Liquid 5milliLiter(s) Swish and Spit two times a day  potassium chloride  10 mEq/100 mL IVPB 10milliEquivalent(s) IV Intermittent every 1 hour  albumin human 25% IVPB 100milliLiter(s) IV Intermittent every 1 hour    MEDICATIONS  (PRN):  acetaminophen   Tablet 650milliGRAM(s) Oral every 6 hours PRN For Temp greater than 38.5 C (101.3 F)  acetaminophen   Tablet. 650milliGRAM(s) Oral every 6 hours PRN Mild Pain (1 - 3)  dextrose Gel 1Dose(s) Oral once PRN Blood Glucose LESS THAN 70 milliGRAM(s)/deciliter  glucagon  Injectable 1milliGRAM(s) IntraMuscular once PRN Glucose LESS THAN 70 milligrams/deciliter  HYDROmorphone  Injectable 1milliGRAM(s) IV Push every 4 hours PRN Severe Pain          DVT Prophylaxis: Hep SQ    Advanced Directives:  Discussed with:    Visit Information:    45-min CC Time is exclusive of billed procedures and/or teaching and/or routine family updates.

## 2017-05-29 NOTE — PROGRESS NOTE ADULT - SUBJECTIVE AND OBJECTIVE BOX
NEPHROLOGY CONSULT  HPI:  87F.  c/o abdominal pain.  left flank which wraps arround to right side beneath diaphragm.   onset 1 day ago.  no provocative or palliative factors.  no a/w diet.    ED given MS + Zosyn + NS.  US (+) gallstone and sludge.  CT AB/pelvis (+) gallstone and sludge in gallbladder neck.  (+) inflammation.  lactate 2.6.    Pt developing vidya, metabolic acidosis, lactate improved.  pH 6.94, base excess -25  spoke to pts son he is agreeable to HD    5/29  awake responsive nods appropriately to questions  intubated  s/p HD yesterday, tolerated well  still on pressors       PMHx:  type 2DM;  HTN;  hyperlipidemia.    PSHx:  hip sx.    ALL:  PCN-swelling of the lips.    Rx:  reviewed.    SocHx:  lives w/ son in the community.  able to ambulate w/ cane.  no tobacco.  no EtOH.    FHx:  NC. (22 May 2017 10:30)      PAST MEDICAL & SURGICAL HISTORY:  Hyperlipidemia  Diabetes: DM II  Hypertension      FAMILY HISTORY:      MEDICATIONS  (STANDING):  docusate sodium 100milliGRAM(s) Oral three times a day  dextrose 5%. 1000milliLiter(s) IV Continuous <Continuous>  dextrose 50% Injectable 12.5Gram(s) IV Push once  dextrose 50% Injectable 25Gram(s) IV Push once  dextrose 50% Injectable 25Gram(s) IV Push once  atorvastatin 10milliGRAM(s) Oral at bedtime  polyethylene glycol 3350 17Gram(s) Oral daily  multivitamin 1Tablet(s) Oral daily  heparin  Injectable 5000Unit(s) SubCutaneous every 8 hours  phenylephrine    Infusion 0.5MICROgram(s)/kG/Min IV Continuous <Continuous>  ALBUTerol/ipratropium for Nebulization 3milliLiter(s) Nebulizer two times a day  amiodarone Infusion 1mG/Min IV Continuous <Continuous>  propofol Infusion 5MICROgram(s)/kG/Min IV Continuous <Continuous>  meropenem IVPB 500milliGRAM(s) IV Intermittent every 12 hours  sodium bicarbonate  Infusion 0.276mEq/kG/Hr IV Continuous <Continuous>  norepinephrine Infusion 0.5MICROgram(s)/kG/Min IV Continuous <Continuous>  pantoprazole  Injectable 40milliGRAM(s) IV Push daily  chlorhexidine 0.12% Liquid 5milliLiter(s) Swish and Spit two times a day  potassium chloride  10 mEq/100 mL IVPB 10milliEquivalent(s) IV Intermittent every 1 hour    MEDICATIONS  (PRN):  acetaminophen   Tablet 650milliGRAM(s) Oral every 6 hours PRN For Temp greater than 38.5 C (101.3 F)  acetaminophen   Tablet. 650milliGRAM(s) Oral every 6 hours PRN Mild Pain (1 - 3)  dextrose Gel 1Dose(s) Oral once PRN Blood Glucose LESS THAN 70 milliGRAM(s)/deciliter  glucagon  Injectable 1milliGRAM(s) IntraMuscular once PRN Glucose LESS THAN 70 milligrams/deciliter  HYDROmorphone  Injectable 1milliGRAM(s) IV Push every 4 hours PRN Severe Pain        Allergies    penicillins (Unknown)    Intolerances        I&O's Summary  I & Os for 24h ending 29 May 2017 07:00  =============================================  IN: 50386.4 ml / OUT: 1320 ml / NET: 9081.4 ml    I & Os for current day (as of 29 May 2017 09:51)  =============================================  IN: 546.4 ml / OUT: 40 ml / NET: 506.4 ml          REVIEW OF SYSTEMS:    CONSTITUTIONAL:  As per HPI.  intubated unable to communicate    PHYSICAL EXAM:    General:  On vent, more responsive today    Neuro:  cannot evaluate    HEENT:  No JVD, Eyes anicteric, No carotid bruits.No lymphadenopathy,    Cardiovascular:  Regular rate and rhythm, with normal S1 and S2. No murmurs, rubs,  or gallops. No JVD.    Lungs:  Lungs clear. no rales, no wheezing, .    Abdomen:  soft, + bs     Skin:  Warm, dry, well-perfused. No rashes or other lesions.     Extremities:  edema, warm      ICU Vital Signs Last 24 Hrs  T(C): 36.3, Max: 37.1 (05-28 @ 18:02)  T(F): 97.4, Max: 98.8 (05-28 @ 18:02)  HR: 100 (82 - 120)  BP: 101/64 (76/43 - 154/131)  BP(mean): 71 (41 - 137)  ABP: --  ABP(mean): --  RR: 13 (0 - 23)  SpO2: 99% (81% - 100%)                              9.1    8.3   )-----------( 134      ( 29 May 2017 07:20 )             27.2   05-29    140  |  107  |  25<H>  ----------------------------<  208<H>  2.6<LL>   |  22  |  1.74<H>    Ca    6.4<LL>      29 May 2017 07:20

## 2017-05-29 NOTE — PROGRESS NOTE ADULT - SUBJECTIVE AND OBJECTIVE BOX
[facility] report reviewed    suggests acute pancreatitis as well as chest wall edema vs cellulitis.    amylase/lipase added to am labs.

## 2017-05-29 NOTE — PROGRESS NOTE ADULT - ASSESSMENT
86 y/o Female with h/o DM type 2, HL, HTN was admitted on 5/22 for worsening abdominal pain. She has left flank pain which wraps around to right side beneath diaphragm. In ED she received Zosyn.     1. Acute respiratory failure. Sepsis with ESBL E.coli and EN avium. Acute gallstone cholecystitis s/p surgery with mixed GNR and enterococcus avium and EN faecalis. ARF on HD. Allergy to PCN.  -Hypotension. Metabolic acidosis.  -fever is down  -leukocytosis is improving  -s/p cipro 400 mg IV q12h s/p metronidazole 500 mg IV q8h # 5  -on meropenem IV # 4  -tolerating abx well so far; no side effects noted   -monitor closely due to PCN allergy history  -continue abx coverage  -f/u repeat BC x 2  -prognosis is poor  -monitor temps  -f/u CBC  -supportive care  2. Other issues: DM type 2, HL, HTN  -care per medicine

## 2017-05-29 NOTE — PROGRESS NOTE ADULT - ASSESSMENT
87y old F:  Acute Resp Failure - On Vent  NORAH - On RRT  Septic Shock - On pressors  s/p Open Ronda  HTN  DM    Plan:  Cont ICU Care  Critically Ill  Acute Resp Failure - On Vent  Will not wean vent while in shock  Septic Shock - On pressors  Keep MAP > 65  NPO / IVF  Monitor renal function - On RRT  For HD today   Strict I/O's  IV Abx per Meropenem  DVT prophylaxis - Hep SQ  Case d/w Nephew at bedside with all questions answered in detail.

## 2017-05-29 NOTE — PROGRESS NOTE ADULT - ASSESSMENT
87F.  c/o abdominal pain.  left flank which wraps arround to right side beneath diaphragm.   onset 1 day ago.  no provocative or palliative factors.  no a/w diet.    ED given MS + Zosyn + NS.  US (+) gallstone and sludge.  CT AB/pelvis (+) gallstone and sludge in gallbladder neck.  (+) inflammation.  lactate 2.6.    Pt developing vidya, metabolic acidosis, lactate improved.  pH 6.94, base excess -25  spoke to pts son he is agreeable to HD  D/w Surgery intensivist  pt will be started on hd today , will need temp catheter      5/29  awake responsive nods appropriately to questions  intubated  s/p HD yesterday, tolerated well  still on pressors  will dialyze today 3 hours will attempt fluid removal 1 kg  she is edematous

## 2017-05-29 NOTE — PROGRESS NOTE ADULT - SUBJECTIVE AND OBJECTIVE BOX
Patient is a 87y old  Female who presents with a chief complaint of abdominal pain. (22 May 2017 10:30)      HPI:  pt seen and examined, intubated, sedated, on HD, still on pressors elevated serum lactate. new abdominal wall swelling, cellulitis         Vital Signs Last 24 Hrs  T(C): 36.9, Max: 37.1 (05-28 @ 18:02)  T(F): 98.4, Max: 98.8 (05-28 @ 18:02)  HR: 97 (87 - 120)  BP: 101/50 (76/43 - 154/131)  BP(mean): 62 (41 - 137)  RR: 8 (0 - 25)  SpO2: 95% (89% - 100%)      I&O's Summary  I & Os for 24h ending 29 May 2017 07:00  =============================================  IN: 13536.4 ml / OUT: 1320 ml / NET: 9081.4 ml    I & Os for current day (as of 29 May 2017 16:54)  =============================================  IN: 1693.8 ml / OUT: 220 ml / NET: 1473.8 ml      PHYSICAL EXAM:      Constitutional: NAD    General:  Intubated    Respiratory:  CTABL    Cardiovascular: S1+S2+0    Gastrointestinal : Abdomen soft, non distended, appropriately tender. Dressing and incision CDI. ROBERT serous. Cellulitis o crepitus of rt flank, RLR , umbilical area with induration, mild epiderma bullae    Extremities: NV intact, anasarca.    Neurological: full exam not possible          Labs:                          9.1    8.3   )-----------( 134      ( 29 May 2017 07:20 )             27.2       05-29    140  |  107  |  25<H>  ----------------------------<  208<H>  2.6<LL>   |  22  |  1.74<H>    Ca    6.4<LL>      29 May 2017 07:20      LIVER:  Unremarkable.  GALLBLADDER: There is inflammation without abscess in the gallbladder   fossa, consistent with reported history of recent cholecystectomy.   Percutaneous drainage catheter is present adjacent to the gallbladder   fossa. Noassociated fluid collection..  BILIARY TREE: Unremarkable.  PANCREAS: Unremarkable    SPLEEN: Unremarkable.  ADRENALS: Unremarkable.  KIDNEYS/URETERS: Single simple cyst in the right kidney. Single exophytic   lesion off the midpole of the posterior left kidney measuring   approximately 2.1 x 2.2 cm, it is difficult to measure attenuation   secondary to streak artifact, however is unchanged in size since prior CT   exam of March 28, 2016, and previously measured cystic attenuation.    BOWEL:  No bowel obstruction or inflammatory process.    PERITONEUM/RETROPERITONEUM:  No ascites, free air or significant   lymphadenopathy.  BLADDER: Unremarkable.  REPRODUCTIVE ORGANS: Unremarkable.    VASCULATURE: Within normal limits.  ABDOMINAL WALL:  Extensive diffuse subcutaneous edema of the abdominal   wall.    OSSEOUS STRUCTURES:  Left hip prosthesis is in place. No acute fracture.    IMPRESSION:   Inflammation without abscess in the gallbladder fossa, consistent with   reported history of recentcholecystectomy. Percutaneous drainage   catheter is present adjacent to the gallbladder fossa. No associated   fluid collection.    Small to moderate right pleural effusion and small left pleural effusion   with underlying atelectasis. Small amount of atelectasis versus   peribronchial consolidations within the right upper lobe is also   suggested.

## 2017-05-30 DIAGNOSIS — A41.51 SEPSIS DUE TO ESCHERICHIA COLI [E. COLI]: ICD-10-CM

## 2017-05-30 DIAGNOSIS — K85.10 BILIARY ACUTE PANCREATITIS WITHOUT NECROSIS OR INFECTION: ICD-10-CM

## 2017-05-30 LAB
ANION GAP SERPL CALC-SCNC: 10 MMOL/L — SIGNIFICANT CHANGE UP (ref 5–17)
BASE EXCESS BLDA CALC-SCNC: -7 MMOL/L — LOW (ref -2–2)
BUN SERPL-MCNC: 15 MG/DL — SIGNIFICANT CHANGE UP (ref 7–23)
CALCIUM SERPL-MCNC: 6.8 MG/DL — LOW (ref 8.5–10.1)
CHLORIDE SERPL-SCNC: 106 MMOL/L — SIGNIFICANT CHANGE UP (ref 96–108)
CO2 SERPL-SCNC: 23 MMOL/L — SIGNIFICANT CHANGE UP (ref 22–31)
CREAT SERPL-MCNC: 1.36 MG/DL — HIGH (ref 0.5–1.3)
GAS PNL BLDA: SIGNIFICANT CHANGE UP
GLUCOSE SERPL-MCNC: 147 MG/DL — HIGH (ref 70–99)
HCO3 BLDA-SCNC: 18 MMOL/L — LOW (ref 21–29)
HCT VFR BLD CALC: 26.7 % — LOW (ref 34.5–45)
HGB BLD-MCNC: 9 G/DL — LOW (ref 11.5–15.5)
MCHC RBC-ENTMCNC: 32.1 PG — SIGNIFICANT CHANGE UP (ref 27–34)
MCHC RBC-ENTMCNC: 33.6 GM/DL — SIGNIFICANT CHANGE UP (ref 32–36)
MCV RBC AUTO: 95.4 FL — SIGNIFICANT CHANGE UP (ref 80–100)
PCO2 BLDA: 32 MMHG — SIGNIFICANT CHANGE UP (ref 32–46)
PH BLDA: 7.36 — SIGNIFICANT CHANGE UP (ref 7.35–7.45)
PLATELET # BLD AUTO: 80 K/UL — LOW (ref 150–400)
PO2 BLDA: 253 — SIGNIFICANT CHANGE UP
POTASSIUM SERPL-MCNC: 3.5 MMOL/L — SIGNIFICANT CHANGE UP (ref 3.5–5.3)
POTASSIUM SERPL-SCNC: 3.5 MMOL/L — SIGNIFICANT CHANGE UP (ref 3.5–5.3)
RBC # BLD: 2.8 M/UL — LOW (ref 3.8–5.2)
RBC # FLD: 15.8 % — HIGH (ref 10.3–14.5)
SAO2 % BLDA: 100 — SIGNIFICANT CHANGE UP
SODIUM SERPL-SCNC: 139 MMOL/L — SIGNIFICANT CHANGE UP (ref 135–145)
WBC # BLD: 9.5 K/UL — SIGNIFICANT CHANGE UP (ref 3.8–10.5)
WBC # FLD AUTO: 9.5 K/UL — SIGNIFICANT CHANGE UP (ref 3.8–10.5)

## 2017-05-30 PROCEDURE — 71010: CPT | Mod: 26

## 2017-05-30 RX ORDER — VASOPRESSIN 20 [USP'U]/ML
0.04 INJECTION INTRAVENOUS
Qty: 100 | Refills: 0 | Status: DISCONTINUED | OUTPATIENT
Start: 2017-05-30 | End: 2017-06-01

## 2017-05-30 RX ORDER — FONDAPARINUX SODIUM 2.5 MG/.5ML
7.5 INJECTION, SOLUTION SUBCUTANEOUS DAILY
Qty: 0 | Refills: 0 | Status: DISCONTINUED | OUTPATIENT
Start: 2017-05-30 | End: 2017-06-01

## 2017-05-30 RX ORDER — ALBUMIN HUMAN 25 %
100 VIAL (ML) INTRAVENOUS
Qty: 0 | Refills: 0 | Status: DISCONTINUED | OUTPATIENT
Start: 2017-05-30 | End: 2017-06-01

## 2017-05-30 RX ADMIN — PHENYLEPHRINE HYDROCHLORIDE 15.3 MICROGRAM(S)/KG/MIN: 10 INJECTION INTRAVENOUS at 16:57

## 2017-05-30 RX ADMIN — CHLORHEXIDINE GLUCONATE 5 MILLILITER(S): 213 SOLUTION TOPICAL at 05:19

## 2017-05-30 RX ADMIN — HEPARIN SODIUM 5000 UNIT(S): 5000 INJECTION INTRAVENOUS; SUBCUTANEOUS at 05:18

## 2017-05-30 RX ADMIN — MEROPENEM 200 MILLIGRAM(S): 1 INJECTION INTRAVENOUS at 18:14

## 2017-05-30 RX ADMIN — FONDAPARINUX SODIUM 7.5 MILLIGRAM(S): 2.5 INJECTION, SOLUTION SUBCUTANEOUS at 13:41

## 2017-05-30 RX ADMIN — AMIODARONE HYDROCHLORIDE 33.33 MG/MIN: 400 TABLET ORAL at 05:17

## 2017-05-30 RX ADMIN — PROPOFOL 2.45 MICROGRAM(S)/KG/MIN: 10 INJECTION, EMULSION INTRAVENOUS at 05:45

## 2017-05-30 RX ADMIN — HYDROMORPHONE HYDROCHLORIDE 1 MILLIGRAM(S): 2 INJECTION INTRAMUSCULAR; INTRAVENOUS; SUBCUTANEOUS at 09:25

## 2017-05-30 RX ADMIN — PANTOPRAZOLE SODIUM 40 MILLIGRAM(S): 20 TABLET, DELAYED RELEASE ORAL at 13:41

## 2017-05-30 RX ADMIN — HYDROMORPHONE HYDROCHLORIDE 1 MILLIGRAM(S): 2 INJECTION INTRAMUSCULAR; INTRAVENOUS; SUBCUTANEOUS at 18:44

## 2017-05-30 RX ADMIN — HYDROMORPHONE HYDROCHLORIDE 1 MILLIGRAM(S): 2 INJECTION INTRAMUSCULAR; INTRAVENOUS; SUBCUTANEOUS at 18:14

## 2017-05-30 RX ADMIN — VASOPRESSIN 2.4 UNIT(S)/MIN: 20 INJECTION INTRAVENOUS at 11:52

## 2017-05-30 RX ADMIN — POLYETHYLENE GLYCOL 3350 17 GRAM(S): 17 POWDER, FOR SOLUTION ORAL at 13:41

## 2017-05-30 RX ADMIN — Medication 100 MILLILITER(S): at 11:48

## 2017-05-30 RX ADMIN — HYDROMORPHONE HYDROCHLORIDE 1 MILLIGRAM(S): 2 INJECTION INTRAMUSCULAR; INTRAVENOUS; SUBCUTANEOUS at 00:32

## 2017-05-30 RX ADMIN — MEROPENEM 200 MILLIGRAM(S): 1 INJECTION INTRAVENOUS at 05:19

## 2017-05-30 RX ADMIN — HYDROMORPHONE HYDROCHLORIDE 1 MILLIGRAM(S): 2 INJECTION INTRAMUSCULAR; INTRAVENOUS; SUBCUTANEOUS at 01:00

## 2017-05-30 RX ADMIN — HYDROMORPHONE HYDROCHLORIDE 1 MILLIGRAM(S): 2 INJECTION INTRAMUSCULAR; INTRAVENOUS; SUBCUTANEOUS at 09:55

## 2017-05-30 RX ADMIN — Medication 100 MILLILITER(S): at 13:30

## 2017-05-30 RX ADMIN — PHENYLEPHRINE HYDROCHLORIDE 15.3 MICROGRAM(S)/KG/MIN: 10 INJECTION INTRAVENOUS at 05:46

## 2017-05-30 RX ADMIN — ATORVASTATIN CALCIUM 10 MILLIGRAM(S): 80 TABLET, FILM COATED ORAL at 21:59

## 2017-05-30 RX ADMIN — Medication 1 TABLET(S): at 13:41

## 2017-05-30 RX ADMIN — CHLORHEXIDINE GLUCONATE 5 MILLILITER(S): 213 SOLUTION TOPICAL at 18:14

## 2017-05-30 NOTE — PROGRESS NOTE ADULT - ASSESSMENT
87F.  c/o abdominal pain.  left flank which wraps arround to right side beneath diaphragm.   onset 1 day ago.  no provocative or palliative factors.  no a/w diet.    ED given MS + Zosyn + NS.  US (+) gallstone and sludge.  CT AB/pelvis (+) gallstone and sludge in gallbladder neck.  (+) inflammation.  lactate 2.6.    Pt developing vidya, metabolic acidosis, lactate improved.  pH 6.94, base excess -25  spoke to pts son he is agreeable to HD  D/w Surgery intensivist  pt will be started on hd today , will need temp catheter      5/29  awake responsive nods appropriately to questions  intubated  s/p HD yesterday, tolerated well  still on pressors  will dialyze today 3 hours will attempt fluid removal 1 kg  she is edematous    5/30  remains on vent  pressors support  anuric  creat of 1.3 does not signify return of renal function.  Pt was dialyzed yesterday, is edematous and malnourished, post op, advanced age, all of which will prevent mounting a higher creat in vidya..  Will attempt fluid removal on HD if tolerates 87F.  c/o abdominal pain.  left flank which wraps arround to right side beneath diaphragm.   onset 1 day ago.  no provocative or palliative factors.  no a/w diet.    ED given MS + Zosyn + NS.  US (+) gallstone and sludge.  CT AB/pelvis (+) gallstone and sludge in gallbladder neck.  (+) inflammation.  lactate 2.6.    Pt developing vidya, metabolic acidosis, lactate improved.  pH 6.94, base excess -25  spoke to pts son he is agreeable to HD  D/w Surgery intensivist  pt will be started on hd today , will need temp catheter      5/29  awake responsive nods appropriately to questions  intubated  s/p HD yesterday, tolerated well  still on pressors  will dialyze today 3 hours will attempt fluid removal 1 kg  she is edematous    5/30  remains on vent  pressors support  anuric  creat of 1.3 does not signify return of renal function.  Pt was dialyzed yesterday, is edematous and malnourished, post op, advanced age, all of which will prevent mounting a higher creat in vidya..  Will attempt fluid removal on HD if tolerates    5/30   (addendum)  seen on HD   tolerating fluid removal  got 1 u SPA, will give another unit

## 2017-05-30 NOTE — PROGRESS NOTE ADULT - ASSESSMENT
87y old F:  Acute Resp Failure - On Vent  NORAH - On RRT  Septic Shock - On pressors  s/p Open Ronda  HTN  DM    Plan:    ICU    PULM: No vent weaning.      Cardio: Will add vasopressin and try to wean off pressors.  AMIO for PHILIP    Renal: HD today    GI: No BS--PPI.  No feeds    ENDO: RISS    HEM: Check and change to Arixtra for a HIT score of 4    Will place an A-Line    Venodynes

## 2017-05-30 NOTE — PROGRESS NOTE ADULT - ASSESSMENT
88 y/o Female with h/o DM type 2, HL, HTN was admitted on 5/22 for worsening abdominal pain. She has left flank pain which wraps around to right side beneath diaphragm. In ED she received Zosyn.     1. Acute respiratory failure. Sepsis with ESBL E.coli and EN avium. Acute gallstone cholecystitis s/p surgery with mixed GNR and enterococcus avium and EN faecalis. ARF on HD. Allergy to PCN.  -remains on vasopressors  -fever is down  -leukocytosis is resolved  -s/p cipro 400 mg IV q12h s/p metronidazole 500 mg IV q8h # 5  -on meropenem IV # 5  -tolerating abx well so far; no side effects noted   -monitor closely due to PCN allergy history  -continue abx coverage  -prognosis is poor  -monitor temps  -f/u CBC  -supportive care  2. Other issues: DM type 2, HL, HTN  -care per medicine

## 2017-05-30 NOTE — PROGRESS NOTE ADULT - SUBJECTIVE AND OBJECTIVE BOX
Patient is a 87y old  Female who presents with a chief complaint of abdominal pain. (22 May 2017 10:30)    HPI:  88 y/o Female with h/o DM type 2, HL, HTN was admitted on  for worsening abdominal pain. She has left flank pain which wraps around to right side beneath diaphragm. In ED she received Zosyn.  US (+) gallstone and sludge.  CT AB/pelvis (+) gallstone and sludge in gallbladder neck.  (+) inflammation.  lactate 2.6.      Remains intubated and placed on ventilatory support  Remains hypotensive on pressors  On HD  Abdominal drain in place  No fever    MEDICATIONS  (STANDING):  atorvastatin 10milliGRAM(s) Oral at bedtime  polyethylene glycol 3350 17Gram(s) Oral daily  multivitamin 1Tablet(s) Oral daily  phenylephrine    Infusion 0.5MICROgram(s)/kG/Min IV Continuous <Continuous>  ALBUTerol/ipratropium for Nebulization 3milliLiter(s) Nebulizer two times a day  amiodarone Infusion 1mG/Min IV Continuous <Continuous>  propofol Infusion 5MICROgram(s)/kG/Min IV Continuous <Continuous>  meropenem IVPB 500milliGRAM(s) IV Intermittent every 12 hours  norepinephrine Infusion 0.5MICROgram(s)/kG/Min IV Continuous <Continuous>  pantoprazole  Injectable 40milliGRAM(s) IV Push daily  chlorhexidine 0.12% Liquid 5milliLiter(s) Swish and Spit two times a day  vasopressin Infusion 0.04Unit(s)/Min IV Continuous <Continuous>  fondaparinux Injectable 7.5milliGRAM(s) SubCutaneous daily  albumin human 25% IVPB 100milliLiter(s) IV Intermittent every 1 hour    MEDICATIONS  (PRN):  acetaminophen   Tablet 650milliGRAM(s) Oral every 6 hours PRN For Temp greater than 38.5 C (101.3 F)  acetaminophen   Tablet. 650milliGRAM(s) Oral every 6 hours PRN Mild Pain (1 - 3)  HYDROmorphone  Injectable 1milliGRAM(s) IV Push every 4 hours PRN Severe Pain      Vital Signs Last 24 Hrs  T(C): 36.9, Max: 37.1 ( @ 18:00)  T(F): 98.4, Max: 98.7 ( @ 18:00)  HR: 77 (77 - 105)  BP: 129/60 (89/43 - 145/62)  BP(mean): 62 (45 - 82)  RR: 0 (0 - 25)  SpO2: 94% (92% - 100%)    Physical Exam:    Vital Signs Last 24 Hrs  T(C): 36.8, Max: 37.1 ( @ 18:02)  T(F): 98.2, Max: 98.8 ( @ 18:02)  HR: 93 (87 - 120)  BP: 120/46 (76/43 - 154/131)  BP(mean): 64 (41 - 137)  RR: 16 (0 - 23)  SpO2: 100% (89% - 100%)    Physical Exam:    Constitutional: frail looking  HEENT: NC/AT, EOMI, PERRLA  Neck: supple  Back: no tenderness  Respiratory: b/l rhonchi  Cardiovascular: S1S2 regular, no murmurs  Abdomen: soft, RUQ abdomen tender, mild distended, positive BS; drain in place; postoperative wound is clean  Genitourinary: deferred  Musculoskeletal: no muscle tenderness, no joint swelling or tenderness  Extremities: no pedal edema  Neurological: confused, moving all extremities, no focal deficits  Skin: no rashes    Labs:                        9.0    9.5   )-----------( 80       ( 30 May 2017 06:03 )             26.7         139  |  106  |  15  ----------------------------<  147<H>  3.5   |  23  |  1.36<H>    Ca    6.8<L>      30 May 2017 05:25               9.1    8.3   )-----------( 134      ( 29 May 2017 07:20 )             27.2         140  |  107  |  25<H>  ----------------------------<  208<H>  2.6<LL>   |  22  |  1.74<H>    Ca    6.4<LL>      29 May 2017 07:20               10.7   12.3  )-----------( 197      ( 28 May 2017 07:04 )             33.8     05-28    139  |  114<H>  |  32<H>  ----------------------------<  107<H>  4.6   |  6<LL>  |  2.15<H>    Ca    7.3<L>      28 May 2017 07:04               10.5   12.3  )-----------( 167      ( 27 May 2017 09:34 )             31.8     05-27    140  |  116<H>  |  28<H>  ----------------------------<  90  4.0   |  10<LL>  |  1.86<H>    Ca    6.7<L>      27 May 2017 07:28    TPro  6.3  /  Alb  2.3<L>  /  TBili  1.1  /  DBili  x   /  AST  67<H>  /  ALT  26  /  AlkPhos  79  05               11.1   18.8  )-----------( 212      ( 26 May 2017 04:48 )             32.6     05-26    134<L>  |  109<H>  |  23  ----------------------------<  174<H>  4.3   |  18<L>  |  2.25<H>    Ca    6.4<LL>      26 May 2017 04:48    TPro  6.3  /  Alb  2.3<L>  /  TBili  1.1  /  DBili  x   /  AST  67<H>  /  ALT  26  /  AlkPhos  79  05           Cultures:              11.5   15.2  )-----------( 204      ( 24 May 2017 05:55 )             33.5     05-24    138  |  107  |  10  ----------------------------<  141<H>  4.2   |  22  |  1.06    Ca    7.8<L>      24 May 2017 05:55    TPro  6.8  /  Alb  2.8<L>  /  TBili  1.6<H>  /  DBili  x   /  AST  24  /  ALT  22  /  AlkPhos  86  05           Cultures:              10.6   5.6   )-----------( 209      ( 23 May 2017 06:23 )             33.4         143  |  111<H>  |  11  ----------------------------<  129<H>  5.0   |  23  |  0.99    Ca    8.1<L>      23 May 2017 06:23  Mg     1.7         TPro  6.8  /  Alb  2.8<L>  /  TBili  1.1  /  DBili  0.2  /  AST  18  /  ALT  22  /  AlkPhos  83                 12.3   7.7   )-----------( 272      ( 21 May 2017 22:34 )             38.0         138  |  108  |  20  ----------------------------<  143<H>  5.2   |  24  |  1.34<H>    Ca    8.3<L>      21 May 2017 22:34  Mg     1.7         TPro  8.2  /  Alb  3.5  /  TBili  0.6  /  DBili  x   /  AST  26  /  ALT  26  /  AlkPhos  117       LIVER FUNCTIONS - ( 21 May 2017 22:34 )  Alb: 3.5 g/dL / Pro: 8.2 gm/dL / ALK PHOS: 117 U/L / ALT: 26 U/L / AST: 26 U/L / GGT: x           Urinalysis Basic - ( 22 May 2017 02:04 )    Color: Yellow / Appearance: Clear / S.010 / pH: x  Gluc: x / Ketone: Negative  / Bili: Negative / Urobili: Negative mg/dL   Blood: x / Protein: Negative mg/dL / Nitrite: Negative   Leuk Esterase: Negative / RBC: 3-5 /HPF / WBC 0-2   Sq Epi: x / Non Sq Epi: Negative / Bacteria: Occasional    Culture - Surgical Swab (17 @ 17:00)    Specimen Source: .Surgical Swab gall bladder    Culture Results:   Numerous Mixed gram negative rods  Numerous Enterococcus species    Culture - Blood (17 @ 13:56)    -  Ampicillin/Sulbactam: R <=8/4    -  Cefepime: R >16    -  Ceftriaxone: R >32    -  Ciprofloxacin: R >2    -  Gentamicin: S <=4    -  Imipenem: S <=1    -  Piperacillin/Tazobactam: R <=16    -  Amikacin: S <=16    -  Ampicillin: R >16    -  Cefoxitin: S <=8    -  Ceftazidime: R 16    Gram Stain:   Growth in aerobic bottle: Gram Negative Rods  Growth in anaerobic bottle: Gram Positive Cocci in Pairs and Chains    -  Ertapenem: S <=1    -  Trimethoprim/Sulfamethoxazole: R >2/38    -  Aztreonam: R >16    -  Cefazolin: R >16    -  Levofloxacin: R >4    -  Meropenem: S <=1    -  Tobramycin: S <=4    Specimen Source: .Blood None    Organism: Escherichia coli ESBL    Culture Results:   Growth in aerobic bottle:  Escherichia coli  Growth in anaerobic bottle:  Enterococcus faecalis and Enterococcus avium    Organism Identification: Escherichia coli ESBL    Method Type: SLIME    Culture - Surgical Swab (17 @ 17:00)    -  Ampicillin: S <=2    -  Ampicillin: S <=2    -  Tetra/Doxy: S <=4    -  Vancomycin: S 2    -  Erythromycin: I 1    -  Erythromycin: S <=0.5    -  Tetra/Doxy: R >8    -  Ciprofloxacin: S <=1    -  Ciprofloxacin: S <=1    -  Vancomycin: S 0.5    Specimen Source: .Surgical Swab gall bladder    Culture Results:   Numerous Mixed gram negative rods  Numerous Enterococcus faecalis  Numerous Enterococcus avium    Organism Identification: Enterococcus faecalis  Enterococcus avium    Organism: Enterococcus avium    Organism: Enterococcus faecalis    Method Type: SLIME    Method Type: SLIME        Radiology:    CT abdomen and pelvis:   Gallstones and/or sludge with gallstones suggested in the gallbladder   neck. Predominantly pericholecystic inflammatory change, increased from   prior study. Findings could represent acute cholecystitis. Consider   further evaluation with nuclear medicine DISIDA scan which is more   sensitive for the detection of such diagnosis.    HIDA:  Abnormal morphine-augmented hepatobiliary scan.  Findings consistent with acute cholecystitis.    Advanced directives addressed: full resuscitation

## 2017-05-30 NOTE — PROGRESS NOTE ADULT - SUBJECTIVE AND OBJECTIVE BOX
Events noted, remains critical.    Vital Signs Last 24 Hrs  T(C): 36.9, Max: 37 (05-30 @ 04:00)  T(F): 98.4, Max: 98.6 (05-30 @ 04:00)  HR: 69 (69 - 99)  BP: 94/27 (79/65 - 145/62)  BP(mean): 45 (45 - 100)  RR: 12 (0 - 23)  SpO2: 95% (82% - 100%)    Lab Results:  CBC  CBC Full  -  ( 30 May 2017 06:03 )  WBC Count : 9.5 K/uL  Hemoglobin : 9.0 g/dL  Hematocrit : 26.7 %  Platelet Count - Automated : 80 K/uL  Mean Cell Volume : 95.4 fl  Mean Cell Hemoglobin : 32.1 pg  Mean Cell Hemoglobin Concentration : 33.6 gm/dL  Auto Neutrophil # : x  Auto Lymphocyte # : x  Auto Monocyte # : x  Auto Eosinophil # : x  Auto Basophil # : x  Auto Neutrophil % : x  Auto Lymphocyte % : x  Auto Monocyte % : x  Auto Eosinophil % : x  Auto Basophil % : x    .		Differential:	[] Automated		[] Manual  Chemistry                        9.0    9.5   )-----------( 80       ( 30 May 2017 06:03 )             26.7     05-30    139  |  106  |  15  ----------------------------<  147<H>  3.5   |  23  |  1.36<H>    Ca    6.8<L>      30 May 2017 05:25              ABG - ( 30 May 2017 19:04 )  pH: 7.36  /  pCO2: 32    /  pO2: 253   / HCO3: 18    / Base Excess: -7    /  SaO2: 100                 MICROBIOLOGY/CULTURES:  Culture Results:   Numerous Mixed gram negative rods  Numerous Enterococcus faecalis  Numerous Enterococcus avium (05-25 @ 17:00)  Culture Results:   Growth in aerobic bottle:  Escherichia coli ESBL  Growth in anaerobic bottle:  Enterococcus faecalis and Enterococcus avium (05-25 @ 13:56)  Culture Results:   Growth in aerobic and anaerobic bottles:  Escherichia coli ESBL  Growth in anaerobic bottle:  Enterococcus faecalis and Enterococcus avium  See previous culture [id number] (05-25 @ 13:44)  Culture Results:   No growth at 5 days. (05-24 @ 14:30)      RADIOLOGY RESULTS:

## 2017-05-30 NOTE — PROGRESS NOTE ADULT - SUBJECTIVE AND OBJECTIVE BOX
NEPHROLOGY CONSULT  HPI:  87F.  c/o abdominal pain.  left flank which wraps arround to right side beneath diaphragm.   onset 1 day ago.  no provocative or palliative factors.  no a/w diet.    ED given MS + Zosyn + NS.  US (+) gallstone and sludge.  CT AB/pelvis (+) gallstone and sludge in gallbladder neck.  (+) inflammation.  lactate 2.6.    Pt developing vidya, metabolic acidosis, lactate improved.  pH 6.94, base excess -25  spoke to pts son he is agreeable to HD    5/29  awake responsive nods appropriately to questions  intubated  s/p HD yesterday, tolerated well  still on pressors    5/30  anuric  BP better  may try to wean down pressors  no new events  Surgery input noted, "abd wall cellulitis"       PMHx:  type 2DM;  HTN;  hyperlipidemia.    PSHx:  hip sx.    ALL:  PCN-swelling of the lips.    Rx:  reviewed.    SocHx:  lives w/ son in the community.  able to ambulate w/ cane.  no tobacco.  no EtOH.    FHx:  NC. (22 May 2017 10:30)      PAST MEDICAL & SURGICAL HISTORY:  Hyperlipidemia  Diabetes: DM II  Hypertension      FAMILY HISTORY:      MEDICATIONS  (STANDING):  docusate sodium 100milliGRAM(s) Oral three times a day  dextrose 5%. 1000milliLiter(s) IV Continuous <Continuous>  dextrose 50% Injectable 12.5Gram(s) IV Push once  dextrose 50% Injectable 25Gram(s) IV Push once  dextrose 50% Injectable 25Gram(s) IV Push once  atorvastatin 10milliGRAM(s) Oral at bedtime  polyethylene glycol 3350 17Gram(s) Oral daily  multivitamin 1Tablet(s) Oral daily  heparin  Injectable 5000Unit(s) SubCutaneous every 8 hours  phenylephrine    Infusion 0.5MICROgram(s)/kG/Min IV Continuous <Continuous>  ALBUTerol/ipratropium for Nebulization 3milliLiter(s) Nebulizer two times a day  amiodarone Infusion 1mG/Min IV Continuous <Continuous>  propofol Infusion 5MICROgram(s)/kG/Min IV Continuous <Continuous>  meropenem IVPB 500milliGRAM(s) IV Intermittent every 12 hours  sodium bicarbonate  Infusion 0.276mEq/kG/Hr IV Continuous <Continuous>  norepinephrine Infusion 0.5MICROgram(s)/kG/Min IV Continuous <Continuous>  pantoprazole  Injectable 40milliGRAM(s) IV Push daily  chlorhexidine 0.12% Liquid 5milliLiter(s) Swish and Spit two times a day  potassium chloride  10 mEq/100 mL IVPB 10milliEquivalent(s) IV Intermittent every 1 hour    MEDICATIONS  (PRN):  acetaminophen   Tablet 650milliGRAM(s) Oral every 6 hours PRN For Temp greater than 38.5 C (101.3 F)  acetaminophen   Tablet. 650milliGRAM(s) Oral every 6 hours PRN Mild Pain (1 - 3)  dextrose Gel 1Dose(s) Oral once PRN Blood Glucose LESS THAN 70 milliGRAM(s)/deciliter  glucagon  Injectable 1milliGRAM(s) IntraMuscular once PRN Glucose LESS THAN 70 milligrams/deciliter  HYDROmorphone  Injectable 1milliGRAM(s) IV Push every 4 hours PRN Severe Pain    Vital Signs Last 24 Hrs  T(C): 37, Max: 37.1 (05-29 @ 18:00)  T(F): 98.6, Max: 98.7 (05-29 @ 18:00)  HR: 87 (83 - 105)  BP: 120/59 (79/50 - 145/62)  BP(mean): 72 (45 - 82)  RR: 0 (0 - 25)  SpO2: 97% (92% - 100%)        Allergies    penicillins (Unknown)    Intolerances                REVIEW OF SYSTEMS:    CONSTITUTIONAL:  As per HPI.  intubated unable to communicate    PHYSICAL EXAM:    General:  On vent, more responsive today    Neuro:  cannot evaluate    HEENT:  No JVD, Eyes anicteric, No carotid bruits.No lymphadenopathy,    Cardiovascular:  Regular rate and rhythm, with normal S1 and S2. No murmurs, rubs,  or gallops. No JVD.    Lungs:  Lungs clear. no rales, no wheezing, .    Abdomen:  soft, + bs     Skin:  Warm, dry, well-perfused. No rashes or other lesions.     Extremities:  edema, warm                                9.0    9.5   )-----------( 80       ( 30 May 2017 06:03 )             26.7     05-30    139  |  106  |  15  ----------------------------<  147<H>  3.5   |  23  |  1.36<H>    Ca    6.8<L>      30 May 2017 05:25

## 2017-05-30 NOTE — PROGRESS NOTE ADULT - SUBJECTIVE AND OBJECTIVE BOX
HPI:  87F.  c/o abdominal pain.  left flank which wraps arround to right side beneath diaphragm.   onset 1 day ago.  no provocative or palliative factors.  no a/w diet.    ED given MS + Zosyn + NS.  US (+) gallstone and sludge.  CT AB/pelvis (+) gallstone and sludge in gallbladder neck.  (+) inflammation.  lactate 2.6.      5/29 - Events noted. Remains on the vent. Pressors for BP support. Had 1st HD yesterday.  5/30: She remains on vent, 2 pressors and requiring HD.          PMHx:  type 2DM;  HTN;  hyperlipidemia.    PSHx:  hip sx.    ALL:  PCN-swelling of the lips.    Rx:  reviewed.    SocHx:  lives w/ son in the community.  able to ambulate w/ cane.  no tobacco.  no EtOH.    FHx:  NC. (22 May 2017 10:30)       PAST MEDICAL & SURGICAL HISTORY:  Hyperlipidemia  Diabetes: DM II  Hypertension      FAMILY HISTORY:      Social Hx:    Allergies    penicillins (Unknown)    Intolerances            ICU Vital Signs Last 24 Hrs  T(C): 36.8, Max: 37.1 (05-29 @ 18:00)  T(F): 98.2, Max: 98.7 (05-29 @ 18:00)  HR: 90 (83 - 105)  BP: 128/55 (79/50 - 145/62)  BP(mean): 72 (45 - 82)  ABP: --  ABP(mean): --  RR: 0 (0 - 25)  SpO2: 98% (92% - 100%)      Mode: AC/ CMV (Assist Control/ Continuous Mandatory Ventilation)  RR (machine): 12  TV (machine): 550  FiO2: 40  PEEP: 5  PS: 5  ITime: 1  PIP: 42      I&O's Summary  I & Os for 24h ending 30 May 2017 07:00  =============================================  IN: 3436.1 ml / OUT: 900 ml / NET: 2536.1 ml    I & Os for current day (as of 30 May 2017 10:33)  =============================================  IN: 173 ml / OUT: 70 ml / NET: 103 ml                            9.0    9.5   )-----------( 80       ( 30 May 2017 06:03 )             26.7       05-30    139  |  106  |  15  ----------------------------<  147<H>  3.5   |  23  |  1.36<H>    Ca    6.8<L>      30 May 2017 05:25              ABG - ( 28 May 2017 11:34 )  pH: 6.97  /  pCO2: 23    /  pO2: 348   / HCO3: 5     / Base Excess: -26   /  SaO2: 99                      MEDICATIONS  (STANDING):  atorvastatin 10milliGRAM(s) Oral at bedtime  polyethylene glycol 3350 17Gram(s) Oral daily  multivitamin 1Tablet(s) Oral daily  phenylephrine    Infusion 0.5MICROgram(s)/kG/Min IV Continuous <Continuous>  ALBUTerol/ipratropium for Nebulization 3milliLiter(s) Nebulizer two times a day  amiodarone Infusion 1mG/Min IV Continuous <Continuous>  propofol Infusion 5MICROgram(s)/kG/Min IV Continuous <Continuous>  meropenem IVPB 500milliGRAM(s) IV Intermittent every 12 hours  norepinephrine Infusion 0.5MICROgram(s)/kG/Min IV Continuous <Continuous>  pantoprazole  Injectable 40milliGRAM(s) IV Push daily  chlorhexidine 0.12% Liquid 5milliLiter(s) Swish and Spit two times a day  vasopressin Infusion 0.04Unit(s)/Min IV Continuous <Continuous>  fondaparinux Injectable 7.5milliGRAM(s) SubCutaneous daily    MEDICATIONS  (PRN):  acetaminophen   Tablet 650milliGRAM(s) Oral every 6 hours PRN For Temp greater than 38.5 C (101.3 F)  acetaminophen   Tablet. 650milliGRAM(s) Oral every 6 hours PRN Mild Pain (1 - 3)  HYDROmorphone  Injectable 1milliGRAM(s) IV Push every 4 hours PRN Severe Pain      DVT Prophylaxis: Arixtra    Advanced Directives:  Discussed with:    Visit Information: 45 min    ** Time is exclusive of billed procedures and/or teaching and/or routine family updates.

## 2017-05-31 LAB
ALBUMIN SERPL ELPH-MCNC: 3 G/DL — LOW (ref 3.3–5)
ALP SERPL-CCNC: 227 U/L — HIGH (ref 40–120)
ALT FLD-CCNC: 70 U/L — SIGNIFICANT CHANGE UP (ref 12–78)
ANION GAP SERPL CALC-SCNC: 18 MMOL/L — HIGH (ref 5–17)
ANION GAP SERPL CALC-SCNC: 22 MMOL/L — HIGH (ref 5–17)
AST SERPL-CCNC: 337 U/L — HIGH (ref 15–37)
BASE EXCESS BLDA CALC-SCNC: -13 MMOL/L — LOW (ref -2–2)
BILIRUB DIRECT SERPL-MCNC: 2.3 MG/DL — HIGH (ref 0–0.2)
BILIRUB INDIRECT FLD-MCNC: 1.4 MG/DL — HIGH (ref 0.2–1)
BILIRUB SERPL-MCNC: 3.7 MG/DL — HIGH (ref 0.2–1.2)
BLOOD GAS COMMENTS ARTERIAL: SIGNIFICANT CHANGE UP
BUN SERPL-MCNC: 12 MG/DL — SIGNIFICANT CHANGE UP (ref 7–23)
BUN SERPL-MCNC: 9 MG/DL — SIGNIFICANT CHANGE UP (ref 7–23)
CALCIUM SERPL-MCNC: 7.3 MG/DL — LOW (ref 8.5–10.1)
CALCIUM SERPL-MCNC: 7.3 MG/DL — LOW (ref 8.5–10.1)
CHLORIDE SERPL-SCNC: 104 MMOL/L — SIGNIFICANT CHANGE UP (ref 96–108)
CHLORIDE SERPL-SCNC: 105 MMOL/L — SIGNIFICANT CHANGE UP (ref 96–108)
CK SERPL-CCNC: 230 U/L — HIGH (ref 26–192)
CO2 SERPL-SCNC: 12 MMOL/L — LOW (ref 22–31)
CO2 SERPL-SCNC: 14 MMOL/L — LOW (ref 22–31)
CREAT SERPL-MCNC: 1.35 MG/DL — HIGH (ref 0.5–1.3)
CREAT SERPL-MCNC: 1.54 MG/DL — HIGH (ref 0.5–1.3)
CULTURE RESULTS: SIGNIFICANT CHANGE UP
GLUCOSE SERPL-MCNC: 51 MG/DL — LOW (ref 70–99)
GLUCOSE SERPL-MCNC: 7 MG/DL — CRITICAL LOW (ref 70–99)
HCO3 BLDA-SCNC: 11 MMOL/L — LOW (ref 21–29)
HCT VFR BLD CALC: 26.3 % — LOW (ref 34.5–45)
HGB BLD-MCNC: 8.7 G/DL — LOW (ref 11.5–15.5)
HOROWITZ INDEX BLDA+IHG-RTO: 40 — SIGNIFICANT CHANGE UP
LACTATE SERPL-SCNC: 13.8 MMOL/L — CRITICAL HIGH (ref 0.7–2)
LACTATE SERPL-SCNC: 14.1 MMOL/L — CRITICAL HIGH (ref 0.7–2)
LDH SERPL L TO P-CCNC: 411 U/L — HIGH (ref 50–242)
MCHC RBC-ENTMCNC: 32.2 PG — SIGNIFICANT CHANGE UP (ref 27–34)
MCHC RBC-ENTMCNC: 33.1 GM/DL — SIGNIFICANT CHANGE UP (ref 32–36)
MCV RBC AUTO: 97.2 FL — SIGNIFICANT CHANGE UP (ref 80–100)
ORGANISM # SPEC MICROSCOPIC CNT: SIGNIFICANT CHANGE UP
PCO2 BLDA: 21 MMHG — LOW (ref 32–46)
PF4 HEPARIN CMPLX AB SER-ACNC: NEGATIVE — SIGNIFICANT CHANGE UP
PF4 HEPARIN CMPLX AB SERPL QL IA: 0.23 ABS — SIGNIFICANT CHANGE UP
PH BLDA: 7.35 — SIGNIFICANT CHANGE UP (ref 7.35–7.45)
PLATELET # BLD AUTO: 119 K/UL — LOW (ref 150–400)
PO2 BLDA: 81 — SIGNIFICANT CHANGE UP
POTASSIUM SERPL-MCNC: 4 MMOL/L — SIGNIFICANT CHANGE UP (ref 3.5–5.3)
POTASSIUM SERPL-MCNC: 4.9 MMOL/L — SIGNIFICANT CHANGE UP (ref 3.5–5.3)
POTASSIUM SERPL-SCNC: 4 MMOL/L — SIGNIFICANT CHANGE UP (ref 3.5–5.3)
POTASSIUM SERPL-SCNC: 4.9 MMOL/L — SIGNIFICANT CHANGE UP (ref 3.5–5.3)
PROT SERPL-MCNC: 5.9 GM/DL — LOW (ref 6–8.3)
RBC # BLD: 2.7 M/UL — LOW (ref 3.8–5.2)
RBC # FLD: 16.5 % — HIGH (ref 10.3–14.5)
SAO2 % BLDA: 96 — SIGNIFICANT CHANGE UP
SODIUM SERPL-SCNC: 137 MMOL/L — SIGNIFICANT CHANGE UP (ref 135–145)
SODIUM SERPL-SCNC: 138 MMOL/L — SIGNIFICANT CHANGE UP (ref 135–145)
SPECIMEN SOURCE: SIGNIFICANT CHANGE UP
TROPONIN I SERPL-MCNC: 2.46 NG/ML — HIGH (ref 0.01–0.04)
TROPONIN I SERPL-MCNC: 3.51 NG/ML — HIGH (ref 0.01–0.04)
WBC # BLD: 14.6 K/UL — HIGH (ref 3.8–10.5)
WBC # FLD AUTO: 14.6 K/UL — HIGH (ref 3.8–10.5)

## 2017-05-31 PROCEDURE — 93010 ELECTROCARDIOGRAM REPORT: CPT

## 2017-05-31 PROCEDURE — 93306 TTE W/DOPPLER COMPLETE: CPT | Mod: 26

## 2017-05-31 RX ORDER — NOREPINEPHRINE BITARTRATE/D5W 8 MG/250ML
0.5 PLASTIC BAG, INJECTION (ML) INTRAVENOUS
Qty: 32 | Refills: 0 | Status: DISCONTINUED | OUTPATIENT
Start: 2017-05-31 | End: 2017-06-01

## 2017-05-31 RX ORDER — HYDROCORTISONE 20 MG
100 TABLET ORAL ONCE
Qty: 0 | Refills: 0 | Status: COMPLETED | OUTPATIENT
Start: 2017-05-31 | End: 2017-05-31

## 2017-05-31 RX ORDER — SODIUM BICARBONATE 1 MEQ/ML
50 SYRINGE (ML) INTRAVENOUS ONCE
Qty: 0 | Refills: 0 | Status: COMPLETED | OUTPATIENT
Start: 2017-05-31 | End: 2017-05-31

## 2017-05-31 RX ORDER — ALBUMIN HUMAN 25 %
100 VIAL (ML) INTRAVENOUS
Qty: 0 | Refills: 0 | Status: DISCONTINUED | OUTPATIENT
Start: 2017-05-31 | End: 2017-06-01

## 2017-05-31 RX ORDER — PHENYLEPHRINE HYDROCHLORIDE 10 MG/ML
0.5 INJECTION INTRAVENOUS
Qty: 160 | Refills: 0 | Status: DISCONTINUED | OUTPATIENT
Start: 2017-05-31 | End: 2017-06-01

## 2017-05-31 RX ADMIN — MEROPENEM 200 MILLIGRAM(S): 1 INJECTION INTRAVENOUS at 05:12

## 2017-05-31 RX ADMIN — Medication 50 MILLIEQUIVALENT(S): at 20:21

## 2017-05-31 RX ADMIN — PANTOPRAZOLE SODIUM 40 MILLIGRAM(S): 20 TABLET, DELAYED RELEASE ORAL at 14:05

## 2017-05-31 RX ADMIN — Medication 76.5 MICROGRAM(S)/KG/MIN: at 22:45

## 2017-05-31 RX ADMIN — Medication 76.5 MICROGRAM(S)/KG/MIN: at 20:22

## 2017-05-31 RX ADMIN — Medication 76.5 MICROGRAM(S)/KG/MIN: at 10:48

## 2017-05-31 RX ADMIN — Medication 76.5 MICROGRAM(S)/KG/MIN: at 22:00

## 2017-05-31 RX ADMIN — PHENYLEPHRINE HYDROCHLORIDE 15.3 MICROGRAM(S)/KG/MIN: 10 INJECTION INTRAVENOUS at 13:23

## 2017-05-31 RX ADMIN — VASOPRESSIN 2.4 UNIT(S)/MIN: 20 INJECTION INTRAVENOUS at 06:41

## 2017-05-31 RX ADMIN — Medication 100 MILLILITER(S): at 13:45

## 2017-05-31 RX ADMIN — Medication 100 MILLILITER(S): at 13:06

## 2017-05-31 RX ADMIN — FONDAPARINUX SODIUM 7.5 MILLIGRAM(S): 2.5 INJECTION, SOLUTION SUBCUTANEOUS at 14:06

## 2017-05-31 RX ADMIN — POLYETHYLENE GLYCOL 3350 17 GRAM(S): 17 POWDER, FOR SOLUTION ORAL at 14:05

## 2017-05-31 RX ADMIN — PROPOFOL 2.45 MICROGRAM(S)/KG/MIN: 10 INJECTION, EMULSION INTRAVENOUS at 06:35

## 2017-05-31 RX ADMIN — Medication 76.5 MICROGRAM(S)/KG/MIN: at 07:26

## 2017-05-31 RX ADMIN — PHENYLEPHRINE HYDROCHLORIDE 15.3 MICROGRAM(S)/KG/MIN: 10 INJECTION INTRAVENOUS at 20:20

## 2017-05-31 RX ADMIN — Medication 1 TABLET(S): at 14:06

## 2017-05-31 RX ADMIN — CHLORHEXIDINE GLUCONATE 5 MILLILITER(S): 213 SOLUTION TOPICAL at 17:20

## 2017-05-31 RX ADMIN — PHENYLEPHRINE HYDROCHLORIDE 15.3 MICROGRAM(S)/KG/MIN: 10 INJECTION INTRAVENOUS at 22:00

## 2017-05-31 RX ADMIN — Medication 100 MILLIGRAM(S): at 20:21

## 2017-05-31 RX ADMIN — PHENYLEPHRINE HYDROCHLORIDE 15.3 MICROGRAM(S)/KG/MIN: 10 INJECTION INTRAVENOUS at 06:40

## 2017-05-31 RX ADMIN — CHLORHEXIDINE GLUCONATE 5 MILLILITER(S): 213 SOLUTION TOPICAL at 05:13

## 2017-05-31 RX ADMIN — AMIODARONE HYDROCHLORIDE 33.33 MG/MIN: 400 TABLET ORAL at 07:27

## 2017-05-31 RX ADMIN — MEROPENEM 200 MILLIGRAM(S): 1 INJECTION INTRAVENOUS at 17:20

## 2017-05-31 NOTE — PROGRESS NOTE ADULT - ASSESSMENT
87 Y old female  S/p open cholecystectomy POD 6 in sepsis, renal failure,genralized malperfusion, possible acrdiac event, on three vasoactive agents, on HD, serum lactate 14, troponins elevated, bladder pressure 12.    Serial abdominal exam  Wean pressors as tolerated' Trend labs  Follow up cultures, Growing ESBL  ID following continue antibiotics  Medicine , nephrology following  Cardiology consult, ECHO, trend cardiac enzymes  CTA abdomen if stable r/o ischemic bowel   ICU are appreciated.  D/W son, pt in critically ill, poor prognosis ot a candidate for further surgical intervention at this time, consider Palliative evaluation.

## 2017-05-31 NOTE — PROGRESS NOTE ADULT - ASSESSMENT
87y old F:  Acute Resp Failure - On Vent  NORAH - On RRT  Septic Shock - On pressors  s/p Open Ronda  HTN  DM    Plan:    ICU    PULM: No vent weaning.      Cardio: Continue Vaso, Iban, Levo..  AMIO for PHILIP    Renal: HD today    GI: No BS--PPI.  No feeds.  Not clear if given the prolonged Pressors if she now had bowl ischemia--Surg will come and see her    ENDO: RISS    HEM: Check and change to Arixtra for a HIT score of 4--HIt PND    Unable to place an A-Line in B/L wrists--Femoral not an option and She can not lift up her arms for Ax.   Chelo    Will D/W the family when the arrive as i need the  phone

## 2017-05-31 NOTE — PROGRESS NOTE ADULT - SUBJECTIVE AND OBJECTIVE BOX
Called to bedside.  Patient now on max dose three pressors w/SBP in 80s.  SHERI gee transduced w/venous waveform noted.  BP transduced:  40/30.  Patient minimally arouseable.  Tachycardic.  Coarse breath sounds w/expiratory wheezing b/l.  Abdomen distended w/serosanguinous drainage noted on dressings.  Mottled distal extremities.  Glucose on FS noted to be <10.  Confirmed on chemistry.  Gave solucortef.  Glucose and started D20.  Had discussion using  phone ( #[id number]) w/patient's son Travis.  Discussed patient's overall critical condition.  Discussed/defined DNR.  Explained DNR to son.  Answered questions.  Son is clear that he would want patient to have compressions/shocks/medications should she requiring with the understanding that she may not survive.  Will continue w/current care.

## 2017-05-31 NOTE — PROGRESS NOTE ADULT - SUBJECTIVE AND OBJECTIVE BOX
Patient is a 87y old  Female who presents with a chief complaint of abdominal pain. (22 May 2017 10:30)      HPI:  pt seen and examined, in critical condition, still on three vasoactive agents, on HD, serum lactate is elevated again up to 14.      Vital Signs Last 24 Hrs  T(C): 36.7, Max: 37.8 (05-30 @ 21:00)  T(F): 98, Max: 100.1 (05-30 @ 21:00)  HR: 117 (68 - 120)  BP: 82/70 (47/19 - 148/44)  BP(mean): 73 (25 - 89)  RR: 28 (12 - 34)  SpO2: 86% (72% - 100%)      I&O's Summary  I & Os for 24h ending 31 May 2017 07:00  =============================================  IN: 1596.9 ml / OUT: 750 ml / NET: 846.9 ml    I & Os for current day (as of 31 May 2017 19:04)  =============================================  IN: 1553.7 ml / OUT: 170 ml / NET: 1383.7 ml      PHYSICAL EXAM:      Constitutional: NAD    General:  intubated, sedated    Respiratory:  CTABL, b/l decreased breath sounds in bases.    Cardiovascular: S1+S2+0    Gastrointestinal : Abdomen soft, distended, incision CDI, diffuse abdominal wall and body edema, no cellulitis Enedelia serous.  Extremities: NV intact    Neurological: Exam not possible.          Labs:                          8.7    14.6  )-----------( 119      ( 31 May 2017 05:17 )             26.3       05-31    137  |  105  |  12  ----------------------------<  51<L>  4.0   |  14<L>  |  1.54<H>    Ca    7.3<L>      31 May 2017 05:17    TPro  5.9<L>  /  Alb  3.0<L>  /  TBili  3.7<H>  /  DBili  2.3<H>  /  AST  337<H>  /  ALT  70  /  AlkPhos  227<H>  05-31    Lactate, Blood: 13.8: Test Name:lactate  Called by:noni  Called to:kitty odonnell  Read back 2 Pt IDs:y Read back values:y Date/Tm:05/31/17 19:06 mmol/L (05.31.17 @ 18:20)    Troponin I, Serum Repeat 3 hours x 2 occurrences (05.31.17 @ 13:20)    Troponin I, Serum: 3.510: Test Name:tropi  Called by:dinah  Called to:mavis nunn  Read back 2 Pt IDs:y Read back values:y Date/Tm:05/31/17 14:01  High Sensitivity Troponin and new reference  range effective 7/6/2016 ng/mL    CXR:  IMPRESSION:

## 2017-05-31 NOTE — PROGRESS NOTE ADULT - SUBJECTIVE AND OBJECTIVE BOX
THIS IS A CRITICALLY ILL PATIENT ON 3 PRESSORS FOR HYPOTENSION AND INTUBAED CURRENTLY IN ICU.  Patient is a 87y old  Female who presents with a chief complaint of abdominal pain.     HPI:  88yo obese female with prior history of HTN, DM, hyperlipidemia presented with abdominal pain and underwent emergent open cholectomy.  I evaluated her prior to her emergent surgery when she appered to be euvolemic overall but difficult volume status assesment given obesity.  Postop course complicated by hypotension requiring pressor support, renal failure requiring dialysis and atrial fibirllation with amiodarone requirement.  She was intubated and discussed her case with Dr Frederick and per him her central line in neck and she is oozing from multiple sites and at risk of bleeding.     Pt is not sedated but not arousable., Son at bedside.      PMHx:  type 2DM;  HTN;  hyperlipidemia.    PSHx:  hip sx.    ALL:  PCN-swelling of the lips.    Rx:  reviewed.    SocHx:  lives w/ son in the community.  able to ambulate w/ cane.  no tobacco.  no EtOH.    FHx:  NC. (22 May 2017 10:30)      PAST MEDICAL & SURGICAL HISTORY:  Hyperlipidemia  Diabetes: DM II  Hypertension      MEDICATIONS  (STANDING):  heparin  Injectable 5000Unit(s) SubCutaneous every 12 hours  sodium chloride 0.9%. 1000milliLiter(s) IV Continuous <Continuous>  docusate sodium 100milliGRAM(s) Oral three times a day  insulin lispro (HumaLOG) corrective regimen sliding scale  SubCutaneous three times a day before meals  insulin lispro (HumaLOG) corrective regimen sliding scale  SubCutaneous at bedtime  dextrose 5%. 1000milliLiter(s) IV Continuous <Continuous>  dextrose 50% Injectable 12.5Gram(s) IV Push once  dextrose 50% Injectable 25Gram(s) IV Push once  dextrose 50% Injectable 25Gram(s) IV Push once  aspirin 325milliGRAM(s) Oral daily  losartan 25milliGRAM(s) Oral daily  atorvastatin 10milliGRAM(s) Oral at bedtime  metoprolol succinate ER 50milliGRAM(s) Oral daily  polyethylene glycol 3350 17Gram(s) Oral daily  pantoprazole    Tablet 40milliGRAM(s) Oral before breakfast  multivitamin 1Tablet(s) Oral daily  aztreonam  IVPB 1000milliGRAM(s) IV Intermittent every 8 hours  aztreonam  IVPB  IV Intermittent     MEDICATIONS  (PRN):  acetaminophen   Tablet 650milliGRAM(s) Oral every 6 hours PRN For Temp greater than 38.5 C (101.3 F)  acetaminophen   Tablet. 650milliGRAM(s) Oral every 6 hours PRN Mild Pain (1 - 3)  dextrose Gel 1Dose(s) Oral once PRN Blood Glucose LESS THAN 70 milliGRAM(s)/deciliter  glucagon  Injectable 1milliGRAM(s) IntraMuscular once PRN Glucose LESS THAN 70 milligrams/deciliter      FAMILY HISTORY:      SOCIAL HISTORY:  non smoker, no alcohol use      REVIEW OF SYSTEMS:  unable to obtain.      Vital Signs Last 24 Hrs  T(C): 34.7, Max: 39.5 (05-25 @ 05:10)  T(F): 94.4, Max: 103.1 (05-25 @ 05:10)  HR: 116 (84 - 116)  BP: 98/50 (98/50 - 122/39)  BP(mean): --  RR: 17 (16 - 18)  SpO2: 95% (93% - 100%)    PHYSICAL EXAM-    Constitutional: appears ill lethargic, intubated.     Head: Head is normocephalic and atraumatic.      Neck: central line noted in right neck     Cardiovascular: irregular rate and tachycardic.  Respiratory: Breath sounds are normal. No rales. No wheezing.    Abdomen: distended.    Extremity: trace  pitting edema b/l     Neurologic: lethargic     Skin: No rash, no obvious lesions noted.      Psychiatric: lethargic      INTERPRETATION OF TELEMETRY: atrial fibrillation.     ECG: EKG from today showed afib with /min, low voltage.    I&O's Detail    I & Os for current day (as of 25 May 2017 16:24)  =============================================  IN:    IV PiggyBack: 500 ml    Oral Fluid: 460 ml    sodium chloride 0.9%.: 358 ml    Total IN: 1318 ml  ---------------------------------------------  OUT:    Total OUT: 0 ml  ---------------------------------------------  Total NET: 1318 ml      LABS:                        10.5   14.2  )-----------( 176      ( 25 May 2017 06:01 )             29.5     05-25    137  |  107  |  14  ----------------------------<  116<H>  3.8   |  18<L>  |  1.29    Ca    7.3<L>      25 May 2017 06:01    TPro  6.3  /  Alb  2.4<L>  /  TBili  1.1  /  DBili  x   /  AST  15  /  ALT  16  /  AlkPhos  65  05-25        PT/INR - ( 25 May 2017 13:44 )   PT: 19.8 sec;   INR: 1.81 ratio         PTT - ( 25 May 2017 13:44 )  PTT:39.8 sec    I&O's Summary    I & Os for current day (as of 25 May 2017 16:24)  =============================================  IN: 1318 ml / OUT: 0 ml / NET: 1318 ml    BNP  RADIOLOGY & ADDITIONAL STUDIES:

## 2017-05-31 NOTE — PROGRESS NOTE ADULT - SUBJECTIVE AND OBJECTIVE BOX
NEPHROLOGY INTERVAL HPI/OVERNIGHT EVENTS:  5/31 SY  86 yo woman admitted with abd pain on 5/22.  Found with acute cholecystitis and Gallstone pancreatitis.  Post Open Cholecystectomy on 5/25.  Hospital course complicated by shock leading NORAH and severe metabolic acidosis.  Daily dialysis since 5/28.  Remains intubated.  On 3 pressors and noted with acute drop in hco3 again today.      MEDICATIONS  (STANDING):  atorvastatin 10milliGRAM(s) Oral at bedtime  polyethylene glycol 3350 17Gram(s) Oral daily  multivitamin 1Tablet(s) Oral daily  phenylephrine    Infusion 0.5MICROgram(s)/kG/Min IV Continuous <Continuous>  ALBUTerol/ipratropium for Nebulization 3milliLiter(s) Nebulizer two times a day  amiodarone Infusion 1mG/Min IV Continuous <Continuous>  propofol Infusion 5MICROgram(s)/kG/Min IV Continuous <Continuous>  meropenem IVPB 500milliGRAM(s) IV Intermittent every 12 hours  norepinephrine Infusion 0.5MICROgram(s)/kG/Min IV Continuous <Continuous>  pantoprazole  Injectable 40milliGRAM(s) IV Push daily  chlorhexidine 0.12% Liquid 5milliLiter(s) Swish and Spit two times a day  vasopressin Infusion 0.04Unit(s)/Min IV Continuous <Continuous>  fondaparinux Injectable 7.5milliGRAM(s) SubCutaneous daily  albumin human 25% IVPB 100milliLiter(s) IV Intermittent every 1 hour    MEDICATIONS  (PRN):  acetaminophen   Tablet 650milliGRAM(s) Oral every 6 hours PRN For Temp greater than 38.5 C (101.3 F)  acetaminophen   Tablet. 650milliGRAM(s) Oral every 6 hours PRN Mild Pain (1 - 3)  HYDROmorphone  Injectable 1milliGRAM(s) IV Push every 4 hours PRN Severe Pain          Vital Signs Last 24 Hrs  T(C): 37.3, Max: 37.8 (05-30 @ 21:00)  T(F): 99.1, Max: 100.1 (05-30 @ 21:00)  HR: 112 (68 - 112)  BP: 121/61 (47/29 - 148/44)  BP(mean): 73 (26 - 100)  RR: 25 (0 - 25)  SpO2: 93% (82% - 100%)  Daily     Daily   I & Os for 24h ending 05-31 @ 07:00  =============================================  IN: 1596.9 ml / OUT: 750 ml / NET: 846.9 ml    I & Os for current day (as of 05-31 @ 09:18)  =============================================  IN: 174.5 ml / OUT: 50 ml / NET: 124.5 ml      PHYSICAL EXAM: Intubated and sedated  GENERAL: On vent  CHEST/LUNG: scattered rhonchi  HEART: S1S2 tachy  ABDOMEN: distended  EXTREMITIES: cyanotic toes.  positive edema  SKIN:     LABS:                        8.7    14.6  )-----------( 119      ( 31 May 2017 05:17 )             26.3     05-31    137  |  105  |  12  ----------------------------<  51<L>  4.0   |  14<L>  |  1.54<H>    Ca    7.3<L>      31 May 2017 05:17            ABG - ( 30 May 2017 19:04 )  pH: 7.36  /  pCO2: 32    /  pO2: 253   / HCO3: 18    / Base Excess: -7    /  SaO2: 100                   RADIOLOGY & ADDITIONAL TESTS:

## 2017-05-31 NOTE — PROVIDER CONTACT NOTE (CRITICAL VALUE NOTIFICATION) - TEST AND RESULT REPORTED:
H&H
ABG
Abg
BC (5/25) Gram - rods in aerobic and anaerobic bottles
Blood C&S from 5/25: aerobib and anaerobic bottles  E.Coli , ESBL,.  In anaerobic bottle enterococcus faecalis and avium
CO2 10
Ca 6.4
Lactate 13.8
Potassium  2.6     Calcium 6.4
lactate 14.1
troponin 3.510

## 2017-05-31 NOTE — PROVIDER CONTACT NOTE (CRITICAL VALUE NOTIFICATION) - NAME OF MD/NP/PA/DO NOTIFIED:
STONE Willard
Dr Frederick
Dr Rodriguez
Dr. Frederick
Dr. Rodriguez
Dr. Winters
Dr. Winters
Otoniel
Otoniel

## 2017-05-31 NOTE — PROGRESS NOTE ADULT - ASSESSMENT
87F.  c/o abdominal pain.  left flank which wraps arround to right side beneath diaphragm.   onset 1 day ago.  no provocative or palliative factors.  no a/w diet.    ED given MS + Zosyn + NS.  US (+) gallstone and sludge.  CT AB/pelvis (+) gallstone and sludge in gallbladder neck.  (+) inflammation.  lactate 2.6.    Pt developing norah, metabolic acidosis, lactate improved.  pH 6.94, base excess -25  spoke to pts son he is agreeable to HD  D/w Surgery intensivist  pt will be started on hd today , will need temp catheter      5/29  awake responsive nods appropriately to questions  intubated  s/p HD yesterday, tolerated well  still on pressors  will dialyze today 3 hours will attempt fluid removal 1 kg  she is edematous    5/30  remains on vent  pressors support  anuric  creat of 1.3 does not signify return of renal function.  Pt was dialyzed yesterday, is edematous and malnourished, post op, advanced age, all of which will prevent mounting a higher creat in norah..  Will attempt fluid removal on HD if tolerates    5/30   (addendum)  seen on HD   tolerating fluid removal  got 1 u SPA, will give another unit    5/31 SY  --NORAH with septic shock.  Cholecystitis post Cholecystectomy.  Sepsis with ESBL  E coli,  and Enterococcus  Avium.  Continue abtx.  --Persistent severe metabolic acidosis despite daily HD.  Likely due to severe peripheral hypoperfusion with 3 pressors.  Check Lactate level.  Noted with increasing abd distension as well. Continue to monitor.  Will plan HD again today to improve acidosis.

## 2017-05-31 NOTE — PROGRESS NOTE ADULT - EXTREMITIES
No cyanosis, clubbing or edema
No cyanosis, clubbing or edema
detailed exam
detailed exam
No cyanosis, clubbing or edema
No cyanosis, clubbing or edema

## 2017-05-31 NOTE — PROGRESS NOTE ADULT - SUBJECTIVE AND OBJECTIVE BOX
Patient is a 87y old  Female who presents with a chief complaint of abdominal pain. (22 May 2017 10:30)    HPI:  88 y/o Female with h/o DM type 2, HL, HTN was admitted on  for worsening abdominal pain. She has left flank pain which wraps around to right side beneath diaphragm. In ED she received Zosyn.  US (+) gallstone and sludge.  CT AB/pelvis (+) gallstone and sludge in gallbladder neck.  (+) inflammation.  lactate 2.6.      Remains intubated and placed on ventilatory support  Remains on pressors  On HD  Abdominal drain in place  Weak looking    MEDICATIONS  (STANDING):  atorvastatin 10milliGRAM(s) Oral at bedtime  polyethylene glycol 3350 17Gram(s) Oral daily  multivitamin 1Tablet(s) Oral daily  phenylephrine    Infusion 0.5MICROgram(s)/kG/Min IV Continuous <Continuous>  ALBUTerol/ipratropium for Nebulization 3milliLiter(s) Nebulizer two times a day  amiodarone Infusion 1mG/Min IV Continuous <Continuous>  propofol Infusion 5MICROgram(s)/kG/Min IV Continuous <Continuous>  meropenem IVPB 500milliGRAM(s) IV Intermittent every 12 hours  norepinephrine Infusion 0.5MICROgram(s)/kG/Min IV Continuous <Continuous>  pantoprazole  Injectable 40milliGRAM(s) IV Push daily  chlorhexidine 0.12% Liquid 5milliLiter(s) Swish and Spit two times a day  vasopressin Infusion 0.04Unit(s)/Min IV Continuous <Continuous>  fondaparinux Injectable 7.5milliGRAM(s) SubCutaneous daily  albumin human 25% IVPB 100milliLiter(s) IV Intermittent every 1 hour  albumin human 25% IVPB 100milliLiter(s) IV Intermittent every 1 hour    MEDICATIONS  (PRN):  acetaminophen   Tablet 650milliGRAM(s) Oral every 6 hours PRN For Temp greater than 38.5 C (101.3 F)  acetaminophen   Tablet. 650milliGRAM(s) Oral every 6 hours PRN Mild Pain (1 - 3)  HYDROmorphone  Injectable 1milliGRAM(s) IV Push every 4 hours PRN Severe Pain      Vital Signs Last 24 Hrs  T(C): 36.1, Max: 37.8 (05-30 @ 21:00)  T(F): 97, Max: 100.1 ( @ 21:00)  HR: 112 (68 - 116)  BP: 118/50 (47/29 - 148/44)  BP(mean): 56 (26 - 100)  RR: 30 (0 - 34)  SpO2: 97% (72% - 100%)    Physical Exam:    Constitutional: frail looking  HEENT: NC/AT, EOMI, PERRLA  Neck: supple  Back: no tenderness  Respiratory: b/l rhonchi  Cardiovascular: S1S2 regular, no murmurs  Abdomen: soft, RUQ abdomen tender, mild distended, positive BS; drain in place; postoperative wound is clean  Genitourinary: deferred  Musculoskeletal: no muscle tenderness, no joint swelling or tenderness  Extremities: no pedal edema  Neurological: confused, moving all extremities, no focal deficits  Skin: no rashes    Labs:                        8.7    14.6  )-----------( 119      ( 31 May 2017 05:17 )             26.3         137  |  105  |  12  ----------------------------<  51<L>  4.0   |  14<L>  |  1.54<H>    Ca    7.3<L>      31 May 2017 05:17    TPro  5.9<L>  /  Alb  3.0<L>  /  TBili  3.7<H>  /  DBili  2.3<H>  /  AST  337<H>  /  ALT  70  /  AlkPhos  227<H>                            9.0    9.5   )-----------( 80       ( 30 May 2017 06:03 )             26.7         139  |  106  |  15  ----------------------------<  147<H>  3.5   |  23  |  1.36<H>    Ca    6.8<L>      30 May 2017 05:25               9.1    8.3   )-----------( 134      ( 29 May 2017 07:20 )             27.2         140  |  107  |  25<H>  ----------------------------<  208<H>  2.6<LL>   |  22  |  1.74<H>    Ca    6.4<LL>      29 May 2017 07:20               10.7   12.3  )-----------( 197      ( 28 May 2017 07:04 )             33.8     05-28    139  |  114<H>  |  32<H>  ----------------------------<  107<H>  4.6   |  6<LL>  |  2.15<H>    Ca    7.3<L>      28 May 2017 07:04               10.5   12.3  )-----------( 167      ( 27 May 2017 09:34 )             31.8     05-27    140  |  116<H>  |  28<H>  ----------------------------<  90  4.0   |  10<LL>  |  1.86<H>    Ca    6.7<L>      27 May 2017 07:28    TPro  6.3  /  Alb  2.3<L>  /  TBili  1.1  /  DBili  x   /  AST  67<H>  /  ALT  26  /  AlkPhos  79  05               11.1   18.8  )-----------( 212      ( 26 May 2017 04:48 )             32.6     05-26    134<L>  |  109<H>  |  23  ----------------------------<  174<H>  4.3   |  18<L>  |  2.25<H>    Ca    6.4<LL>      26 May 2017 04:48    TPro  6.3  /  Alb  2.3<L>  /  TBili  1.1  /  DBili  x   /  AST  67<H>  /  ALT  26  /  AlkPhos  79  05           Cultures:              11.5   15.2  )-----------( 204      ( 24 May 2017 05:55 )             33.5     05-24    138  |  107  |  10  ----------------------------<  141<H>  4.2   |  22  |  1.06    Ca    7.8<L>      24 May 2017 05:55    TPro  6.8  /  Alb  2.8<L>  /  TBili  1.6<H>  /  DBili  x   /  AST  24  /  ALT  22  /  AlkPhos  86  0524           Cultures:              10.6   5.6   )-----------( 209      ( 23 May 2017 06:23 )             33.4     23    143  |  111<H>  |  11  ----------------------------<  129<H>  5.0   |  23  |  0.99    Ca    8.1<L>      23 May 2017 06:23  Mg     1.7         TPro  6.8  /  Alb  2.8<L>  /  TBili  1.1  /  DBili  0.2  /  AST  18  /  ALT  22  /  AlkPhos  83                 12.3   7.7   )-----------( 272      ( 21 May 2017 22:34 )             38.0     -21    138  |  108  |  20  ----------------------------<  143<H>  5.2   |  24  |  1.34<H>    Ca    8.3<L>      21 May 2017 22:34  Mg     1.7         TPro  8.2  /  Alb  3.5  /  TBili  0.6  /  DBili  x   /  AST  26  /  ALT  26  /  AlkPhos  117       LIVER FUNCTIONS - ( 21 May 2017 22:34 )  Alb: 3.5 g/dL / Pro: 8.2 gm/dL / ALK PHOS: 117 U/L / ALT: 26 U/L / AST: 26 U/L / GGT: x           Urinalysis Basic - ( 22 May 2017 02:04 )    Color: Yellow / Appearance: Clear / S.010 / pH: x  Gluc: x / Ketone: Negative  / Bili: Negative / Urobili: Negative mg/dL   Blood: x / Protein: Negative mg/dL / Nitrite: Negative   Leuk Esterase: Negative / RBC: 3-5 /HPF / WBC 0-2   Sq Epi: x / Non Sq Epi: Negative / Bacteria: Occasional    Culture - Surgical Swab (17 @ 17:00)    Specimen Source: .Surgical Swab gall bladder    Culture Results:   Numerous Mixed gram negative rods  Numerous Enterococcus species    Culture - Blood (17 @ 13:56)    -  Ampicillin/Sulbactam: R <=8/4    -  Cefepime: R >16    -  Ceftriaxone: R >32    -  Ciprofloxacin: R >2    -  Gentamicin: S <=4    -  Imipenem: S <=1    -  Piperacillin/Tazobactam: R <=16    -  Amikacin: S <=16    -  Ampicillin: R >16    -  Cefoxitin: S <=8    -  Ceftazidime: R 16    Gram Stain:   Growth in aerobic bottle: Gram Negative Rods  Growth in anaerobic bottle: Gram Positive Cocci in Pairs and Chains    -  Ertapenem: S <=1    -  Trimethoprim/Sulfamethoxazole: R >2/38    -  Aztreonam: R >16    -  Cefazolin: R >16    -  Levofloxacin: R >4    -  Meropenem: S <=1    -  Tobramycin: S <=4    Specimen Source: .Blood None    Organism: Escherichia coli ESBL    Culture Results:   Growth in aerobic bottle:  Escherichia coli  Growth in anaerobic bottle:  Enterococcus faecalis and Enterococcus avium    Organism Identification: Escherichia coli ESBL    Method Type: SLIME    Culture - Surgical Swab (17 @ 17:00)    -  Ampicillin: S <=2    -  Ampicillin: S <=2    -  Tetra/Doxy: S <=4    -  Vancomycin: S 2    -  Erythromycin: I 1    -  Erythromycin: S <=0.5    -  Tetra/Doxy: R >8    -  Ciprofloxacin: S <=1    -  Ciprofloxacin: S <=1    -  Vancomycin: S 0.5    Specimen Source: .Surgical Swab gall bladder    Culture Results:   Numerous Mixed gram negative rods  Numerous Enterococcus faecalis  Numerous Enterococcus avium    Organism Identification: Enterococcus faecalis  Enterococcus avium    Organism: Enterococcus avium    Organism: Enterococcus faecalis    Method Type: SLIME    Method Type: SLIME        Radiology:    CT abdomen and pelvis:   Gallstones and/or sludge with gallstones suggested in the gallbladder   neck. Predominantly pericholecystic inflammatory change, increased from   prior study. Findings could represent acute cholecystitis. Consider   further evaluation with nuclear medicine DISIDA scan which is more   sensitive for the detection of such diagnosis.    HIDA:  Abnormal morphine-augmented hepatobiliary scan.  Findings consistent with acute cholecystitis.    Advanced directives addressed: full resuscitation

## 2017-05-31 NOTE — PROGRESS NOTE ADULT - RESPIRATORY
detailed exam
Breath Sounds equal & clear to percussion & auscultation, no accessory muscle use
Breath Sounds equal & clear to percussion & auscultation, no accessory muscle use

## 2017-05-31 NOTE — PROGRESS NOTE ADULT - SUBJECTIVE AND OBJECTIVE BOX
HPI:  87F.  c/o abdominal pain.  left flank which wraps arround to right side beneath diaphragm.   onset 1 day ago.  no provocative or palliative factors.  no a/w diet.    ED given MS + Zosyn + NS.  US (+) gallstone and sludge.  CT AB/pelvis (+) gallstone and sludge in gallbladder neck.  (+) inflammation.  lactate 2.6.      5/29 - Events noted. Remains on the vent. Pressors for BP support. Had 1st HD yesterday.  5/30: She remains on vent, 2 pressors and requiring HD.    5/31: She is doing very poorly today.  Back on 3 Pressors, and her lactate has gone up to 14.  Her bladder pressore is normal at 12.  On HD at this time.          PMHx:  type 2DM;  HTN;  hyperlipidemia.    PSHx:  hip sx.    ALL:  PCN-swelling of the lips.    Rx:  reviewed.    SocHx:  lives w/ son in the community.  able to ambulate w/ cane.  no tobacco.  no EtOH.    FHx:  NC. (22 May 2017 10:30)       PAST MEDICAL & SURGICAL HISTORY:  Hyperlipidemia  Diabetes: DM II  Hypertension      FAMILY HISTORY:      Social Hx:    Allergies    penicillins (Unknown)    Intolerances            ICU Vital Signs Last 24 Hrs  T(C): 36.1, Max: 37.8 (05-30 @ 21:00)  T(F): 97, Max: 100.1 (05-30 @ 21:00)  HR: 109 (68 - 116)  BP: 105/59 (47/29 - 148/44)  BP(mean): 70 (26 - 100)  ABP: --  ABP(mean): --  RR: 32 (0 - 34)  SpO2: 96% (72% - 100%)      Mode: AC/ CMV (Assist Control/ Continuous Mandatory Ventilation)  RR (machine): 12  TV (machine): 550  FiO2: 40  PEEP: 5  ITime: 1  PIP: 30      I&O's Summary  I & Os for 24h ending 31 May 2017 07:00  =============================================  IN: 1596.9 ml / OUT: 750 ml / NET: 846.9 ml    I & Os for current day (as of 31 May 2017 11:37)  =============================================  IN: 693.2 ml / OUT: 110 ml / NET: 583.2 ml                            8.7    14.6  )-----------( 119      ( 31 May 2017 05:17 )             26.3       05-31    137  |  105  |  12  ----------------------------<  51<L>  4.0   |  14<L>  |  1.54<H>    Ca    7.3<L>      31 May 2017 05:17              ABG - ( 30 May 2017 19:04 )  pH: 7.36  /  pCO2: 32    /  pO2: 253   / HCO3: 18    / Base Excess: -7    /  SaO2: 100                     MEDICATIONS  (STANDING):  atorvastatin 10milliGRAM(s) Oral at bedtime  polyethylene glycol 3350 17Gram(s) Oral daily  multivitamin 1Tablet(s) Oral daily  phenylephrine    Infusion 0.5MICROgram(s)/kG/Min IV Continuous <Continuous>  ALBUTerol/ipratropium for Nebulization 3milliLiter(s) Nebulizer two times a day  amiodarone Infusion 1mG/Min IV Continuous <Continuous>  propofol Infusion 5MICROgram(s)/kG/Min IV Continuous <Continuous>  meropenem IVPB 500milliGRAM(s) IV Intermittent every 12 hours  norepinephrine Infusion 0.5MICROgram(s)/kG/Min IV Continuous <Continuous>  pantoprazole  Injectable 40milliGRAM(s) IV Push daily  chlorhexidine 0.12% Liquid 5milliLiter(s) Swish and Spit two times a day  vasopressin Infusion 0.04Unit(s)/Min IV Continuous <Continuous>  fondaparinux Injectable 7.5milliGRAM(s) SubCutaneous daily  albumin human 25% IVPB 100milliLiter(s) IV Intermittent every 1 hour  albumin human 25% IVPB 100milliLiter(s) IV Intermittent every 1 hour    MEDICATIONS  (PRN):  acetaminophen   Tablet 650milliGRAM(s) Oral every 6 hours PRN For Temp greater than 38.5 C (101.3 F)  acetaminophen   Tablet. 650milliGRAM(s) Oral every 6 hours PRN Mild Pain (1 - 3)  HYDROmorphone  Injectable 1milliGRAM(s) IV Push every 4 hours PRN Severe Pain      DVT Prophylaxis: Arixtra    Advanced Directives:  Discussed with:    Visit Information: 75 min    ** Time is exclusive of billed procedures and/or teaching and/or routine family updates.

## 2017-05-31 NOTE — PROGRESS NOTE ADULT - ASSESSMENT
86 y/o Female with h/o DM type 2, HL, HTN was admitted on 5/22 for worsening abdominal pain. She has left flank pain which wraps around to right side beneath diaphragm. In ED she received Zosyn.     1. Acute respiratory failure. Sepsis with ESBL E.coli and EN avium. Acute gallstone cholecystitis s/p surgery with mixed GNR and enterococcus avium and EN faecalis. ARF on HD. Allergy to PCN.  -remains on vasopressors  -has low grade fever  -leukocytosis is persistent  -s/p cipro 400 mg IV q12h s/p metronidazole 500 mg IV q8h # 5  -on meropenem IV # 6  -tolerating abx well so far; no side effects noted   -monitor closely due to PCN allergy history  -continue abx coverage  -prognosis is poor  -monitor temps  -f/u CBC  -supportive care  2. Other issues: DM type 2, HL, HTN  -care per medicine

## 2017-05-31 NOTE — PROGRESS NOTE ADULT - ASSESSMENT
Hypotension with elevated lactate- on pressors and intubated.  Management preICU team.    Atrial fibrillation- post op- likely from stress of hypotension and pressors.  continue amiodarone.  Pt at bleeding risk now prohibiting full dose anticoagulation.  Her echo from 2015 in [facility] showed normal LVEF with no significant abnormalities.    NSTEMI- likely from demand with ongoing hypotension and sepsis.  Hold off full dose anticoagulation for now with prohibitive bleeding risk.    Son at bedside and briefly explained to him pts clinical condition.  He does not seem to pay attention to this and on the other hand he was asking me why she is rolling eyes back.  Poor prognosis and critically ill pt.    Thank you for allowing me to participate in the care of this patient. Please feel free to contact me with any questions.          Thank you for allowing me to participate in the care of this patient. Please feel free to contact me with any questions.

## 2017-06-01 VITALS — OXYGEN SATURATION: 10 % | HEART RATE: 7 BPM

## 2017-06-01 LAB
ALBUMIN SERPL ELPH-MCNC: 2.4 G/DL — LOW (ref 3.3–5)
ALBUMIN SERPL ELPH-MCNC: 2.4 G/DL — LOW (ref 3.3–5)
ALP SERPL-CCNC: 238 U/L — HIGH (ref 40–120)
ALT FLD-CCNC: 430 U/L — HIGH (ref 12–78)
ANION GAP SERPL CALC-SCNC: 25 MMOL/L — HIGH (ref 5–17)
AST SERPL-CCNC: 3307 U/L — HIGH (ref 15–37)
BILIRUB DIRECT SERPL-MCNC: 1 MG/DL — HIGH (ref 0–0.2)
BILIRUB INDIRECT FLD-MCNC: 2.9 MG/DL — HIGH (ref 0.2–1)
BILIRUB SERPL-MCNC: 3.9 MG/DL — HIGH (ref 0.2–1.2)
BUN SERPL-MCNC: 11 MG/DL — SIGNIFICANT CHANGE UP (ref 7–23)
CALCIUM SERPL-MCNC: 6.5 MG/DL — CRITICAL LOW (ref 8.5–10.1)
CHLORIDE SERPL-SCNC: 106 MMOL/L — SIGNIFICANT CHANGE UP (ref 96–108)
CO2 SERPL-SCNC: 8 MMOL/L — CRITICAL LOW (ref 22–31)
CREAT SERPL-MCNC: 1.94 MG/DL — HIGH (ref 0.5–1.3)
GLUCOSE SERPL-MCNC: 308 MG/DL — HIGH (ref 70–99)
HCT VFR BLD CALC: 23.2 % — LOW (ref 34.5–45)
HGB BLD-MCNC: 7.8 G/DL — LOW (ref 11.5–15.5)
LACTATE SERPL-SCNC: 17.8 MMOL/L — CRITICAL HIGH (ref 0.7–2)
MCHC RBC-ENTMCNC: 33.6 GM/DL — SIGNIFICANT CHANGE UP (ref 32–36)
MCHC RBC-ENTMCNC: 34.6 PG — HIGH (ref 27–34)
MCV RBC AUTO: 102.9 FL — HIGH (ref 80–100)
PHOSPHATE SERPL-MCNC: 6.4 MG/DL — HIGH (ref 2.5–4.5)
PLATELET # BLD AUTO: SIGNIFICANT CHANGE UP K/UL (ref 150–400)
POTASSIUM SERPL-MCNC: 5.2 MMOL/L — SIGNIFICANT CHANGE UP (ref 3.5–5.3)
POTASSIUM SERPL-SCNC: 5.2 MMOL/L — SIGNIFICANT CHANGE UP (ref 3.5–5.3)
PROT SERPL-MCNC: 4.9 GM/DL — LOW (ref 6–8.3)
RBC # BLD: 2.25 M/UL — LOW (ref 3.8–5.2)
RBC # FLD: 17.6 % — HIGH (ref 10.3–14.5)
SODIUM SERPL-SCNC: 139 MMOL/L — SIGNIFICANT CHANGE UP (ref 135–145)
SURGICAL PATHOLOGY FINAL REPORT - CH: SIGNIFICANT CHANGE UP
WBC # BLD: 26.7 K/UL — HIGH (ref 3.8–10.5)
WBC # FLD AUTO: 26.7 K/UL — HIGH (ref 3.8–10.5)

## 2017-06-01 RX ORDER — GLUCAGON INJECTION, SOLUTION 0.5 MG/.1ML
1 INJECTION, SOLUTION SUBCUTANEOUS
Qty: 0 | Refills: 0 | Status: DISCONTINUED | OUTPATIENT
Start: 2017-06-01 | End: 2017-06-01

## 2017-06-01 RX ORDER — HYDROCORTISONE 20 MG
50 TABLET ORAL EVERY 6 HOURS
Qty: 0 | Refills: 0 | Status: DISCONTINUED | OUTPATIENT
Start: 2017-06-01 | End: 2017-06-01

## 2017-06-01 RX ORDER — ASCORBIC ACID 60 MG
1500 TABLET,CHEWABLE ORAL EVERY 6 HOURS
Qty: 0 | Refills: 0 | Status: DISCONTINUED | OUTPATIENT
Start: 2017-06-01 | End: 2017-06-01

## 2017-06-01 RX ORDER — EPINEPHRINE 0.3 MG/.3ML
0.05 INJECTION INTRAMUSCULAR; SUBCUTANEOUS
Qty: 4 | Refills: 0 | Status: DISCONTINUED | OUTPATIENT
Start: 2017-06-01 | End: 2017-06-01

## 2017-06-01 RX ORDER — THIAMINE MONONITRATE (VIT B1) 100 MG
200 TABLET ORAL EVERY 12 HOURS
Qty: 0 | Refills: 0 | Status: DISCONTINUED | OUTPATIENT
Start: 2017-06-01 | End: 2017-06-01

## 2017-06-01 RX ADMIN — PHENYLEPHRINE HYDROCHLORIDE 7.65 MICROGRAM(S)/KG/MIN: 10 INJECTION INTRAVENOUS at 00:14

## 2017-06-01 RX ADMIN — Medication 50 MILLIGRAM(S): at 05:10

## 2017-06-01 RX ADMIN — Medication 38.25 MICROGRAM(S)/KG/MIN: at 00:12

## 2017-06-01 RX ADMIN — MEROPENEM 200 MILLIGRAM(S): 1 INJECTION INTRAVENOUS at 05:11

## 2017-06-01 RX ADMIN — CHLORHEXIDINE GLUCONATE 5 MILLILITER(S): 213 SOLUTION TOPICAL at 05:11

## 2017-06-01 RX ADMIN — Medication 38.25 MICROGRAM(S)/KG/MIN: at 05:56

## 2017-06-01 RX ADMIN — Medication 38.25 MICROGRAM(S)/KG/MIN: at 03:51

## 2017-06-01 RX ADMIN — Medication 38.25 MICROGRAM(S)/KG/MIN: at 02:07

## 2017-06-01 RX ADMIN — PHENYLEPHRINE HYDROCHLORIDE 7.65 MICROGRAM(S)/KG/MIN: 10 INJECTION INTRAVENOUS at 05:56

## 2017-06-01 RX ADMIN — Medication 38.25 MICROGRAM(S)/KG/MIN: at 07:41

## 2017-06-01 NOTE — DISCHARGE NOTE FOR THE EXPIRED PATIENT - SECONDARY DIAGNOSIS.
Acute renal failure with tubular necrosis Functional quadriplegia Acute calculous cholecystitis Acute respiratory failure with hypoxia Gallstone pancreatitis Septic encephalopathy Lactic acidosis

## 2017-06-01 NOTE — PROVIDER CONTACT NOTE (OTHER) - SITUATION
Patient asystole on monitor at 1117 with no palpable or audible pulse. MD Anthony notified. Family at bedside at time of expiration.
Patient has a rectal temperature of 102.8.
PT 88yo F w/ cholecystitis. PT's morning .1 rectal temp, 116/54 bp, 107 hr, 93% O2 sat on RA, 18 RR. No change in mental status.
Patient revaluated and still had an elevated temperature of 102.6 and was oxygen saturation was 88% on room air.
Pt with rectal temp 101.8, HR 88, /53, 02 96% on room air.

## 2017-06-01 NOTE — DISCHARGE NOTE FOR THE EXPIRED PATIENT - OTHER SIGNIFICANT FINDINGS
Autopsy discussed with pts HCP and his wife via [place]  phone # [phone] - they declined as they felt it would offer no benefit at this time.    All questions answered and emotional support/condolences provided.

## 2017-06-01 NOTE — DIETITIAN INITIAL EVALUATION ADULT. - PERTINENT MEDS FT
Arixtra, Peridex, Meropenem, Albumin, Glucagon, Levophed, Solu-cortef, Pitressin, Amiodarone, Dilaudid, Lipitor

## 2017-06-01 NOTE — DISCHARGE NOTE FOR THE EXPIRED PATIENT - NAME OF PERSON WHO MADE CONTACTED FAMILY
ESTEBAN Brady jr, MD spoke with isaac via  [phone] - also, Isaac's wife at bedside at [facility].

## 2017-06-01 NOTE — PROVIDER CONTACT NOTE (OTHER) - NAME OF MD/NP/PA/DO NOTIFIED:
MD Anthony
Called Dr Luque's office. Spoke with Oksana
Consult for Dr. Farmer notified; spoke with Lacey
Dr. Shelley
GOPI Mei
STONE Mei
STONE Vergara
eevtte (spoke to Kathie
spoke with Jacqueline from Dr. Hooker's office and gave her consult information
spoke with Tatum from [facility], Dr. Winston is on call and will see the patient

## 2017-06-01 NOTE — PROGRESS NOTE ADULT - SUBJECTIVE AND OBJECTIVE BOX
87F. s/p open jo-ann/abdominal wall cellulitis on 5/25 (POD#7) - pt doing poorly post-operatively requiring pressors for BP support and deteriorated to the point of requiring reintubation on 5/28.  metabolic acidosis and acute renal failure/NORAH noted requiring the initiation of dialysis 5/28.  CT scan chest/abdomen/pelvis with PO contrast grossly unrevealing of cause of her deterioration.  5/31 bladder pressure [id number].    She is currently requiring 3 vasopressors to maintain MAP and epinephrine infusion started this morning with no sig response.    At this point there is nothing else to add and no intervention that would confer significant improvement to the patient.  She is likely to pass in the short term and given her current level of pressor support, there is no role for ACLS protocol when she arrests.    I spoke with pts SUAD Singh in this regard and told him that we will NOT undertake CPR when pt passes given the futility of such an intervention in the current setting.        PAST MEDICAL & SURGICAL HISTORY:  Hyperlipidemia  Diabetes: DM II  Hypertension        Allergies    penicillins (Unknown)      ICU Vital Signs Last 24 Hrs  T(C): 35.6, Max: 36.7 (05-31 @ 14:05)  T(F): 96.1, Max: 98 (05-31 @ 14:05)  HR: 49 (49 - 121)  BP: 49/26 (47/19 - 143/84)  BP(mean): 20 (20 - 111)  ABP: --  ABP(mean): --  RR: 0 (0 - 34)  SpO2: 16% (16% - 100%)      Mode: AC/ CMV (Assist Control/ Continuous Mandatory Ventilation)  RR (machine): 12  TV (machine): 550  FiO2: 100  PEEP: 5  ITime: 1  PIP: 46      I&O's Summary  I & Os for 24h ending 01 Jun 2017 07:00  =============================================  IN: 7732.4 ml / OUT: 605 ml / NET: 7127.4 ml    I & Os for current day (as of 01 Jun 2017 10:52)  =============================================  IN: 0 ml / OUT: 90 ml / NET: -90 ml                            see note  see note )-----------( see note    ( 01 Jun 2017 06:30 )             see note      06-01    139  |  106  |  11  ----------------------------<  308<H>  5.2   |  8<LL>  |  1.94<H>    Ca    6.5<LL>      01 Jun 2017 06:30  Phos  6.4     06-01    TPro  4.9<L>  /  Alb  2.4<L>  /  TBili  3.9<H>  /  DBili  1.0<H>  /  AST  3307<H>  /  ALT  430<H>  /  AlkPhos  238<H>  06-01      CAPILLARY BLOOD GLUCOSE  23 (01 Jun 2017 07:30)  42 (01 Jun 2017 05:30)  31 (31 May 2017 23:45)  33 (31 May 2017 22:45)  55 (31 May 2017 21:45)  50 (31 May 2017 21:15)  33 (31 May 2017 20:45)  62 (31 May 2017 20:30)  54 (31 May 2017 20:14)  17 (31 May 2017 19:56)  41 (31 May 2017 19:48)  22 (31 May 2017 19:38)      LIVER FUNCTIONS - ( 01 Jun 2017 06:30 )  Alb: 2.4 g/dL / Pro: 4.9 gm/dL / ALK PHOS: 238 U/L / ALT: 430 U/L / AST: 3307 U/L / GGT: x             CARDIAC MARKERS ( 31 May 2017 18:14 )  2.460 ng/mL / x     / x     / x     / x      CARDIAC MARKERS ( 31 May 2017 13:20 )  3.510 ng/mL / x     / x     / x     / x      CARDIAC MARKERS ( 31 May 2017 11:50 )  x     / x     / 230 U/L / x     / x          ABG - ( 31 May 2017 18:30 )  pH: 7.35  /  pCO2: 21    /  pO2: 81    / HCO3: 11    / Base Excess: -13   /  SaO2: 96          MEDICATIONS  (STANDING):  atorvastatin 10milliGRAM(s) Oral at bedtime  ALBUTerol/ipratropium for Nebulization 3milliLiter(s) Nebulizer two times a day  amiodarone Infusion 1mG/Min IV Continuous <Continuous>  meropenem IVPB 500milliGRAM(s) IV Intermittent every 12 hours  pantoprazole  Injectable 40milliGRAM(s) IV Push daily  chlorhexidine 0.12% Liquid 5milliLiter(s) Swish and Spit two times a day  vasopressin Infusion 0.04Unit(s)/Min IV Continuous <Continuous>  fondaparinux Injectable 7.5milliGRAM(s) SubCutaneous daily  albumin human 25% IVPB 100milliLiter(s) IV Intermittent every 1 hour  albumin human 25% IVPB 100milliLiter(s) IV Intermittent every 1 hour  phenylephrine    Infusion 0.5MICROgram(s)/kG/Min IV Continuous <Continuous>  norepinephrine Infusion 0.5MICROgram(s)/kG/Min IV Continuous <Continuous>  hydrocortisone  Injectable 50milliGRAM(s) IV Push every 6 hours  freetext medication -  Infusion 50mL/Hr IV Continuous <Continuous>  EPINEPHrine     Infusion 0.05MICROgram(s)/kG/Min IV Continuous <Continuous>  glucagon  Injectable 1milliGRAM(s) IntraMuscular every 15 minutes    MEDICATIONS  (PRN):  acetaminophen   Tablet 650milliGRAM(s) Oral every 6 hours PRN For Temp greater than 38.5 C (101.3 F)  acetaminophen   Tablet. 650milliGRAM(s) Oral every 6 hours PRN Mild Pain (1 - 3)  HYDROmorphone  Injectable 1milliGRAM(s) IV Push every 4 hours PRN Severe Pain          DVT Prophylaxis: arixtra - treatment dose.    Advanced Directives: futility - will NOT perform CPR (see A&P below)  Discussed with:  [phone]    Visit Information: Critical care time 30 min.    ** Time is exclusive of billed procedures and/or teaching and/or routine family updates.

## 2017-06-01 NOTE — PROGRESS NOTE ADULT - PROVIDER SPECIALTY LIST ADULT
Cardiology
Cardiology
Critical Care
Gastroenterology
Hospitalist
Infectious Disease
Nephrology
Surgery
Critical Care
Hospitalist
Nephrology

## 2017-06-01 NOTE — PROVIDER CONTACT NOTE (OTHER) - DATE AND TIME:
23-May-2017 10:55
01-Jun-2017 11:17
23-May-2017 23:55
24-May-2017 17:28
25-May-2017 06:29
25-May-2017 13:12
28-May-2017 09:38
30-May-2017 18:20
31-May-2017 14:42

## 2017-06-01 NOTE — PROVIDER CONTACT NOTE (OTHER) - ACTION/TREATMENT ORDERED:
Body cleansed, lines removed. Belongings returned to family at bedside. Body sent to [facility] at 1315.

## 2017-06-01 NOTE — DISCHARGE NOTE FOR THE EXPIRED PATIENT - HOSPITAL COURSE
87F. admitted 5/22 with abdominal pain.  Found to have acute cholecystitis and underwent open jo-ann/abdominal wall cellulitis on 5/25 - did poorly post-operatively requiring escalating doses of pressors for BP support and deteriorated to the point of requiring reintubation on 5/28.  pt was suffering from metabolic acidosis and acute renal failure/NORAH requiring the initiation of dialysis 5/28.  CT scan chest/abdomen/pelvis with PO contrast grossly unrevealing of cause of her deterioration.  5/31 bladder pressure [id number].    At the time of her death, pt requiring 3 vasopressors in an attempt to maintain MAP and epinephrine infusion started this morning with no sig response.    At the time of her death there was nothing else to add and no intervention that would confer significant improvement to the patient - there was No role for ACLS protocol when she arrested and prior to her passing it was discussed with pts SUAD Singh in this regard and told him that we would NOT undertake CPR when pt passed given the futility of such an intervention in the current setting.

## 2017-06-01 NOTE — PROGRESS NOTE ADULT - ASSESSMENT
87y old F suffering from acute hypoxic respiratory failure  septic shock due to E coli ESBL bacteremia requiring 4 pressors   NORAH due to ATN - On RRT since 5/29  s/p Open Ronda/abdominal wall cellulitis 5/25  atrial fibrillation with RVR on amiodarone infusion  septic encephalopathy, functional quadriplegia  severe metabolic/lactic acidosis    mortality is a certainty at this point as discussed with pts HCP  via telephone  ACLS/CPR would offer NO benefit to the patient at this time and will NOT be undertaken    HTN  DM    Plan for continuation of current care with the understanding that pt will succumb to critical illness in the near future  HOB 30  pressors  abx  vent support  Dextrose infusion  supportive care

## 2017-06-01 NOTE — PROGRESS NOTE ADULT - SUBJECTIVE AND OBJECTIVE BOX
Spoke w/patient's son regarding need for arterial line placement (patient hypotensive on three pressors).  Used  phone ([phone]) discussed risks/benefits w/son.  Son gave his consent.  Attempted to place left femoral line after viewing both groins under ultrasound guidance.  Vessels noted to be deep and small in caliber.  Multiple attempts were made using needle to access femoral artery.  Femoral artery was able to accessed (as seen on US and by resultant pulsatile blood noted) but wire was unable to be advanced (even w/flexible tipped guidewire).  Pressure placed on femoral artery for 5 minutes.  No bleeding noted.  Catheter unable to be placed.  Patient tolerated well.

## 2017-06-06 DIAGNOSIS — G93.41 METABOLIC ENCEPHALOPATHY: ICD-10-CM

## 2017-06-06 DIAGNOSIS — I95.81 POSTPROCEDURAL HYPOTENSION: ICD-10-CM

## 2017-06-06 DIAGNOSIS — E87.2 ACIDOSIS: ICD-10-CM

## 2017-06-06 DIAGNOSIS — N17.0 ACUTE KIDNEY FAILURE WITH TUBULAR NECROSIS: ICD-10-CM

## 2017-06-06 DIAGNOSIS — I10 ESSENTIAL (PRIMARY) HYPERTENSION: ICD-10-CM

## 2017-06-06 DIAGNOSIS — A41.9 SEPSIS, UNSPECIFIED ORGANISM: ICD-10-CM

## 2017-06-06 DIAGNOSIS — K80.00 CALCULUS OF GALLBLADDER WITH ACUTE CHOLECYSTITIS WITHOUT OBSTRUCTION: ICD-10-CM

## 2017-06-06 DIAGNOSIS — Z79.82 LONG TERM (CURRENT) USE OF ASPIRIN: ICD-10-CM

## 2017-06-06 DIAGNOSIS — B96.20 UNSPECIFIED ESCHERICHIA COLI [E. COLI] AS THE CAUSE OF DISEASES CLASSIFIED ELSEWHERE: ICD-10-CM

## 2017-06-06 DIAGNOSIS — Z88.0 ALLERGY STATUS TO PENICILLIN: ICD-10-CM

## 2017-06-06 DIAGNOSIS — R65.21 SEVERE SEPSIS WITH SEPTIC SHOCK: ICD-10-CM

## 2017-06-06 DIAGNOSIS — R00.0 TACHYCARDIA, UNSPECIFIED: ICD-10-CM

## 2017-06-06 DIAGNOSIS — I48.0 PAROXYSMAL ATRIAL FIBRILLATION: ICD-10-CM

## 2017-06-06 DIAGNOSIS — J96.00 ACUTE RESPIRATORY FAILURE, UNSPECIFIED WHETHER WITH HYPOXIA OR HYPERCAPNIA: ICD-10-CM

## 2017-06-06 DIAGNOSIS — E11.9 TYPE 2 DIABETES MELLITUS WITHOUT COMPLICATIONS: ICD-10-CM

## 2017-06-06 DIAGNOSIS — L03.311 CELLULITIS OF ABDOMINAL WALL: ICD-10-CM

## 2017-06-06 DIAGNOSIS — K85.90 ACUTE PANCREATITIS WITHOUT NECROSIS OR INFECTION, UNSPECIFIED: ICD-10-CM

## 2017-06-06 DIAGNOSIS — E78.5 HYPERLIPIDEMIA, UNSPECIFIED: ICD-10-CM

## 2017-06-06 DIAGNOSIS — K66.0 PERITONEAL ADHESIONS (POSTPROCEDURAL) (POSTINFECTION): ICD-10-CM

## 2018-05-14 NOTE — PROGRESS NOTE ADULT - CONSTITUTIONAL
Well-developed, well nourished
Well-developed, well nourished
detailed exam
detailed exam
Well-developed, well nourished
Well-developed, well nourished
99

## 2019-10-22 NOTE — ED ADULT TRIAGE NOTE - SOURCE OF INFORMATION
Medical Necessity Information: LCD Guidelines vary from payer to payer. Please check with your payer's policy to determine medical necessity. 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

## 2020-03-15 NOTE — ED PROVIDER NOTE - SKIN, MLM
Pulse ox walk with a pt down the lemons and back pt had a pulse ox of 90 percent on room air  
Skin normal color for race, warm, dry and intact. No evidence of rash.

## 2021-02-18 NOTE — ED PROVIDER NOTE - TEMPLATE, MLM
Pt called stating there was a prescription sent in but never received. Turns out it went to the wrong pharmacy, updated pharmacy and called in prescription.   
Abdominal Pain, N/V/D

## 2021-10-06 NOTE — PROCEDURE NOTE - TRACHEAL INTUBATION: NUMBER OF VISUALIZATIONS
Received faxed from [facility] mail order pharmacy for Prednisone 5mg tabs and Albuterol inhaler. Since showing active medications on pt med list prescriptions being sent.   
1

## 2021-11-05 NOTE — PATIENT PROFILE ADULT. - AS SC BRADEN MOBILITY
(3) slightly limited Minocycline Counseling: Patient advised regarding possible photosensitivity and discoloration of the teeth, skin, lips, tongue and gums.  Patient instructed to avoid sunlight, if possible.  When exposed to sunlight, patients should wear protective clothing, sunglasses, and sunscreen.  The patient was instructed to call the office immediately if the following severe adverse effects occur:  hearing changes, easy bruising/bleeding, severe headache, or vision changes.  The patient verbalized understanding of the proper use and possible adverse effects of minocycline.  All of the patient's questions and concerns were addressed.

## 2023-11-13 NOTE — PHYSICAL THERAPY INITIAL EVALUATION ADULT - ASR EQUIP NEEDS DISCH PT EVAL
Please see my chart message.     Question on return visit    Per note 6/21 patient to follow up for lipid panel 9/2023    Does patient need an appointment with Dr. Sears?    Referral to pain pended.     Cat DOWNS     TBD